# Patient Record
Sex: FEMALE | Race: WHITE | NOT HISPANIC OR LATINO
[De-identification: names, ages, dates, MRNs, and addresses within clinical notes are randomized per-mention and may not be internally consistent; named-entity substitution may affect disease eponyms.]

---

## 2017-10-19 ENCOUNTER — TRANSCRIPTION ENCOUNTER (OUTPATIENT)
Age: 57
End: 2017-10-19

## 2017-11-30 ENCOUNTER — TRANSCRIPTION ENCOUNTER (OUTPATIENT)
Age: 57
End: 2017-11-30

## 2017-12-03 ENCOUNTER — TRANSCRIPTION ENCOUNTER (OUTPATIENT)
Age: 57
End: 2017-12-03

## 2018-01-17 ENCOUNTER — TRANSCRIPTION ENCOUNTER (OUTPATIENT)
Age: 58
End: 2018-01-17

## 2018-01-18 ENCOUNTER — APPOINTMENT (OUTPATIENT)
Dept: INTERNAL MEDICINE | Facility: CLINIC | Age: 58
End: 2018-01-18
Payer: MEDICAID

## 2018-01-18 ENCOUNTER — APPOINTMENT (OUTPATIENT)
Dept: INTERNAL MEDICINE | Facility: CLINIC | Age: 58
End: 2018-01-18
Payer: SELF-PAY

## 2018-01-18 VITALS
WEIGHT: 152 LBS | HEIGHT: 63.5 IN | BODY MASS INDEX: 26.6 KG/M2 | DIASTOLIC BLOOD PRESSURE: 68 MMHG | TEMPERATURE: 98.9 F | HEART RATE: 81 BPM | SYSTOLIC BLOOD PRESSURE: 104 MMHG | OXYGEN SATURATION: 97 %

## 2018-01-18 DIAGNOSIS — R35.0 FREQUENCY OF MICTURITION: ICD-10-CM

## 2018-01-18 DIAGNOSIS — Z87.891 PERSONAL HISTORY OF NICOTINE DEPENDENCE: ICD-10-CM

## 2018-01-18 DIAGNOSIS — Z83.79 FAMILY HISTORY OF OTHER DISEASES OF THE DIGESTIVE SYSTEM: ICD-10-CM

## 2018-01-18 DIAGNOSIS — Z82.69 FAMILY HISTORY OF OTHER DISEASES OF THE MUSCULOSKELETAL SYSTEM AND CONNECTIVE TISSUE: ICD-10-CM

## 2018-01-18 DIAGNOSIS — Z82.49 FAMILY HISTORY OF ISCHEMIC HEART DISEASE AND OTHER DISEASES OF THE CIRCULATORY SYSTEM: ICD-10-CM

## 2018-01-18 DIAGNOSIS — E66.3 OVERWEIGHT: ICD-10-CM

## 2018-01-18 DIAGNOSIS — M17.10 UNILATERAL PRIMARY OSTEOARTHRITIS, UNSPECIFIED KNEE: ICD-10-CM

## 2018-01-18 DIAGNOSIS — Z80.8 FAMILY HISTORY OF MALIGNANT NEOPLASM OF OTHER ORGANS OR SYSTEMS: ICD-10-CM

## 2018-01-18 DIAGNOSIS — Z92.89 PERSONAL HISTORY OF OTHER MEDICAL TREATMENT: ICD-10-CM

## 2018-01-18 DIAGNOSIS — Z12.72 ENCOUNTER FOR SCREENING FOR MALIGNANT NEOPLASM OF VAGINA: ICD-10-CM

## 2018-01-18 DIAGNOSIS — R74.0 NONSPECIFIC ELEVATION OF LEVELS OF TRANSAMINASE AND LACTIC ACID DEHYDROGENASE [LDH]: ICD-10-CM

## 2018-01-18 DIAGNOSIS — Z83.511 FAMILY HISTORY OF GLAUCOMA: ICD-10-CM

## 2018-01-18 DIAGNOSIS — Z81.8 FAMILY HISTORY OF OTHER MENTAL AND BEHAVIORAL DISORDERS: ICD-10-CM

## 2018-01-18 DIAGNOSIS — Z01.00 ENCOUNTER FOR EXAMINATION OF EYES AND VISION W/OUT ABNORMAL FINDINGS: ICD-10-CM

## 2018-01-18 DIAGNOSIS — Z78.9 OTHER SPECIFIED HEALTH STATUS: ICD-10-CM

## 2018-01-18 DIAGNOSIS — Z23 ENCOUNTER FOR IMMUNIZATION: ICD-10-CM

## 2018-01-18 PROCEDURE — 81002 URINALYSIS NONAUTO W/O SCOPE: CPT

## 2018-01-18 PROCEDURE — 90717 YELLOW FEVER VACCINE SUBQ: CPT

## 2018-01-18 PROCEDURE — 99213 OFFICE O/P EST LOW 20 MIN: CPT | Mod: 25

## 2018-01-18 PROCEDURE — 93000 ELECTROCARDIOGRAM COMPLETE: CPT

## 2018-01-18 PROCEDURE — 36415 COLL VENOUS BLD VENIPUNCTURE: CPT

## 2018-01-18 PROCEDURE — 99386 PREV VISIT NEW AGE 40-64: CPT | Mod: 25

## 2018-01-18 PROCEDURE — 90471 IMMUNIZATION ADMIN: CPT

## 2018-01-18 RX ORDER — ALBUTEROL SULFATE 90 UG/1
108 (90 BASE) AEROSOL, METERED RESPIRATORY (INHALATION)
Qty: 18 | Refills: 0 | Status: COMPLETED | COMMUNITY
Start: 2017-09-15

## 2018-01-18 RX ORDER — AZITHROMYCIN 250 MG/1
250 TABLET, FILM COATED ORAL
Qty: 6 | Refills: 0 | Status: COMPLETED | COMMUNITY
Start: 2017-09-15

## 2018-01-18 RX ORDER — VITAMIN B COMPLEX
CAPSULE ORAL
Refills: 0 | Status: ACTIVE | COMMUNITY

## 2018-01-18 RX ORDER — TURM/GING/BOS/YUC/WIL/CHAM/HOR 100-100 MG
TABLET ORAL
Refills: 0 | Status: ACTIVE | COMMUNITY

## 2018-01-18 RX ORDER — POLYMYXIN B SULFATE AND TRIMETHOPRIM 10000; 1 [USP'U]/ML; MG/ML
10000-0.1 SOLUTION OPHTHALMIC
Qty: 10 | Refills: 0 | Status: COMPLETED | COMMUNITY
Start: 2017-11-30

## 2018-01-18 RX ORDER — SAW/PYGEUM/BETA/HERB/D3/B6/ZN 30 MG-25MG
CAPSULE ORAL
Refills: 0 | Status: ACTIVE | COMMUNITY

## 2018-01-18 RX ORDER — DICLOFENAC SODIUM 100 MG/1
100 TABLET, FILM COATED, EXTENDED RELEASE ORAL
Qty: 10 | Refills: 0 | Status: COMPLETED | COMMUNITY
Start: 2017-08-21

## 2018-01-18 RX ORDER — TOBRAMYCIN 3 MG/ML
0.3 SOLUTION/ DROPS OPHTHALMIC
Qty: 5 | Refills: 0 | Status: COMPLETED | COMMUNITY
Start: 2017-12-03

## 2018-01-18 RX ORDER — BENZONATATE 200 MG/1
200 CAPSULE ORAL
Qty: 20 | Refills: 0 | Status: COMPLETED | COMMUNITY
Start: 2017-09-15

## 2018-01-18 RX ORDER — FLUOCINONIDE 0.5 MG/G
0.05 CREAM TOPICAL
Qty: 30 | Refills: 0 | Status: COMPLETED | COMMUNITY
Start: 2017-11-20

## 2018-01-18 RX ORDER — FLUTICASONE PROPIONATE 50 UG/1
50 SPRAY, METERED NASAL DAILY
Qty: 3 | Refills: 3 | Status: ACTIVE | COMMUNITY
Start: 2018-01-18

## 2018-01-22 LAB
25(OH)D3 SERPL-MCNC: 32.1 NG/ML
ALBUMIN SERPL ELPH-MCNC: 3.8 G/DL
ALP BLD-CCNC: 62 U/L
ALT SERPL-CCNC: 21 U/L
ANION GAP SERPL CALC-SCNC: 17 MMOL/L
AST SERPL-CCNC: 29 U/L
BASOPHILS # BLD AUTO: 0.03 K/UL
BASOPHILS NFR BLD AUTO: 0.5 %
BILIRUB SERPL-MCNC: 0.4 MG/DL
BUN SERPL-MCNC: 18 MG/DL
CALCIUM SERPL-MCNC: 9.3 MG/DL
CHLORIDE SERPL-SCNC: 107 MMOL/L
CHOLEST SERPL-MCNC: 173 MG/DL
CHOLEST/HDLC SERPL: 3.5 RATIO
CO2 SERPL-SCNC: 23 MMOL/L
CREAT SERPL-MCNC: 0.78 MG/DL
EOSINOPHIL # BLD AUTO: 0.25 K/UL
EOSINOPHIL NFR BLD AUTO: 3.9 %
GLUCOSE SERPL-MCNC: 88 MG/DL
HBA1C MFR BLD HPLC: 5.3 %
HCT VFR BLD CALC: 41.4 %
HDLC SERPL-MCNC: 50 MG/DL
HGB BLD-MCNC: 13.7 G/DL
IMM GRANULOCYTES NFR BLD AUTO: 0.2 %
LDLC SERPL CALC-MCNC: 90 MG/DL
LYMPHOCYTES # BLD AUTO: 1.51 K/UL
LYMPHOCYTES NFR BLD AUTO: 23.5 %
MAN DIFF?: NORMAL
MCHC RBC-ENTMCNC: 29.7 PG
MCHC RBC-ENTMCNC: 33.1 GM/DL
MCV RBC AUTO: 89.8 FL
MONOCYTES # BLD AUTO: 0.58 K/UL
MONOCYTES NFR BLD AUTO: 9 %
NEUTROPHILS # BLD AUTO: 4.04 K/UL
NEUTROPHILS NFR BLD AUTO: 62.9 %
PLATELET # BLD AUTO: 188 K/UL
POTASSIUM SERPL-SCNC: 4.3 MMOL/L
PROT SERPL-MCNC: 7.6 G/DL
RBC # BLD: 4.61 M/UL
RBC # FLD: 13.9 %
SODIUM SERPL-SCNC: 147 MMOL/L
TRIGL SERPL-MCNC: 166 MG/DL
TSH SERPL-ACNC: 0.05 UIU/ML
WBC # FLD AUTO: 6.42 K/UL

## 2018-07-28 PROBLEM — Z80.8 FAMILY HISTORY OF SKIN CANCER: Status: ACTIVE | Noted: 2018-01-18

## 2020-07-16 ENCOUNTER — TRANSCRIPTION ENCOUNTER (OUTPATIENT)
Age: 60
End: 2020-07-16

## 2020-08-11 ENCOUNTER — APPOINTMENT (OUTPATIENT)
Dept: INTERNAL MEDICINE | Facility: CLINIC | Age: 60
End: 2020-08-11
Payer: MEDICAID

## 2020-08-11 VITALS
DIASTOLIC BLOOD PRESSURE: 70 MMHG | HEART RATE: 74 BPM | SYSTOLIC BLOOD PRESSURE: 106 MMHG | WEIGHT: 146 LBS | OXYGEN SATURATION: 97 % | TEMPERATURE: 96.8 F | HEIGHT: 63 IN | BODY MASS INDEX: 25.87 KG/M2

## 2020-08-11 PROCEDURE — 99386 PREV VISIT NEW AGE 40-64: CPT

## 2020-08-11 PROCEDURE — 36415 COLL VENOUS BLD VENIPUNCTURE: CPT

## 2020-08-11 NOTE — HISTORY OF PRESENT ILLNESS
[de-identified] : 59 yo f with hasimotos, Needs a new PMD\par on metformin for insulin resistance\par Trying to lose weight- fasting, bikes a lot and walks a lot.   - Has struggled with weight for years.\par Endo-0 sees Dr Alec Phoenix\par F- 89, M-  at 89- dementia\par Mammo- referral, just saw gyn,  needs derm\par Colonoscopy q 5 years- last 2 years ago, EGD 12 years ago. \par Dexa- has some bone loss\par tetanus in 2015 trip to [place]

## 2020-08-11 NOTE — PLAN
[FreeTextEntry1] : Welness complete\par labs today\par Check thyroid paneal\par  check a1c on Metformin\par uptodate with HCM\par f/up annually\par derm referral

## 2020-08-11 NOTE — PHYSICAL EXAM
[No Acute Distress] : no acute distress [Well Developed] : well developed [Well Nourished] : well nourished [Well-Appearing] : well-appearing [Normal Sclera/Conjunctiva] : normal sclera/conjunctiva [EOMI] : extraocular movements intact [Normal Outer Ear/Nose] : the outer ears and nose were normal in appearance [PERRL] : pupils equal round and reactive to light [No Lymphadenopathy] : no lymphadenopathy [No JVD] : no jugular venous distention [Normal Oropharynx] : the oropharynx was normal [Supple] : supple [Thyroid Normal, No Nodules] : the thyroid was normal and there were no nodules present [No Respiratory Distress] : no respiratory distress  [No Accessory Muscle Use] : no accessory muscle use [Clear to Auscultation] : lungs were clear to auscultation bilaterally [Regular Rhythm] : with a regular rhythm [Normal Rate] : normal rate  [Normal S1, S2] : normal S1 and S2 [No Murmur] : no murmur heard [Pedal Pulses Present] : the pedal pulses are present [No Edema] : there was no peripheral edema [Non Tender] : non-tender [Soft] : abdomen soft [No Masses] : no abdominal mass palpated [Non-distended] : non-distended [No HSM] : no HSM [Normal Posterior Cervical Nodes] : no posterior cervical lymphadenopathy [Normal Anterior Cervical Nodes] : no anterior cervical lymphadenopathy [Normal Bowel Sounds] : normal bowel sounds [No CVA Tenderness] : no CVA  tenderness [No Spinal Tenderness] : no spinal tenderness [Grossly Normal Strength/Tone] : grossly normal strength/tone [No Joint Swelling] : no joint swelling [Coordination Grossly Intact] : coordination grossly intact [No Rash] : no rash [No Focal Deficits] : no focal deficits [Normal Affect] : the affect was normal [Normal Gait] : normal gait [Normal Insight/Judgement] : insight and judgment were intact

## 2020-08-12 LAB
25(OH)D3 SERPL-MCNC: 47.6 NG/ML
ALBUMIN SERPL ELPH-MCNC: 4.6 G/DL
ALP BLD-CCNC: 53 U/L
ALT SERPL-CCNC: 19 U/L
ANION GAP SERPL CALC-SCNC: 14 MMOL/L
APPEARANCE: CLEAR
AST SERPL-CCNC: 22 U/L
BASOPHILS # BLD AUTO: 0.05 K/UL
BASOPHILS NFR BLD AUTO: 0.9 %
BILIRUB SERPL-MCNC: 0.5 MG/DL
BILIRUBIN URINE: NEGATIVE
BLOOD URINE: NEGATIVE
BUN SERPL-MCNC: 14 MG/DL
CALCIUM SERPL-MCNC: 9.3 MG/DL
CHLORIDE SERPL-SCNC: 104 MMOL/L
CHOLEST SERPL-MCNC: 176 MG/DL
CHOLEST/HDLC SERPL: 3.2 RATIO
CO2 SERPL-SCNC: 23 MMOL/L
COLOR: NORMAL
CREAT SERPL-MCNC: 0.6 MG/DL
EOSINOPHIL # BLD AUTO: 0.14 K/UL
EOSINOPHIL NFR BLD AUTO: 2.7 %
ESTIMATED AVERAGE GLUCOSE: 100 MG/DL
GLUCOSE QUALITATIVE U: NEGATIVE
GLUCOSE SERPL-MCNC: 98 MG/DL
HBA1C MFR BLD HPLC: 5.1 %
HCT VFR BLD CALC: 45.7 %
HDLC SERPL-MCNC: 55 MG/DL
HGB BLD-MCNC: 14.5 G/DL
IMM GRANULOCYTES NFR BLD AUTO: 0.2 %
KETONES URINE: NEGATIVE
LDLC SERPL CALC-MCNC: 105 MG/DL
LEUKOCYTE ESTERASE URINE: NEGATIVE
LYMPHOCYTES # BLD AUTO: 1.83 K/UL
LYMPHOCYTES NFR BLD AUTO: 34.7 %
MAN DIFF?: NORMAL
MCHC RBC-ENTMCNC: 30.6 PG
MCHC RBC-ENTMCNC: 31.7 GM/DL
MCV RBC AUTO: 96.4 FL
MONOCYTES # BLD AUTO: 0.55 K/UL
MONOCYTES NFR BLD AUTO: 10.4 %
NEUTROPHILS # BLD AUTO: 2.7 K/UL
NEUTROPHILS NFR BLD AUTO: 51.1 %
NITRITE URINE: NEGATIVE
PH URINE: 5.5
PLATELET # BLD AUTO: 178 K/UL
POTASSIUM SERPL-SCNC: 4.3 MMOL/L
PROT SERPL-MCNC: 7.3 G/DL
PROTEIN URINE: NEGATIVE
RBC # BLD: 4.74 M/UL
RBC # FLD: 13.3 %
SODIUM SERPL-SCNC: 141 MMOL/L
SPECIFIC GRAVITY URINE: 1.01
T3FREE SERPL-MCNC: 5.59 PG/ML
T4 FREE SERPL-MCNC: 1.2 NG/DL
TRIGL SERPL-MCNC: 83 MG/DL
TSH SERPL-ACNC: 0.06 UIU/ML
UROBILINOGEN URINE: NORMAL
WBC # FLD AUTO: 5.28 K/UL

## 2020-10-21 ENCOUNTER — TRANSCRIPTION ENCOUNTER (OUTPATIENT)
Age: 60
End: 2020-10-21

## 2020-10-28 ENCOUNTER — TRANSCRIPTION ENCOUNTER (OUTPATIENT)
Age: 60
End: 2020-10-28

## 2020-11-04 ENCOUNTER — APPOINTMENT (OUTPATIENT)
Dept: DERMATOLOGY | Facility: CLINIC | Age: 60
End: 2020-11-04
Payer: MEDICAID

## 2020-11-04 VITALS — SYSTOLIC BLOOD PRESSURE: 104 MMHG | DIASTOLIC BLOOD PRESSURE: 75 MMHG | TEMPERATURE: 97.7 F

## 2020-11-04 DIAGNOSIS — Z91.89 OTHER SPECIFIED PERSONAL RISK FACTORS, NOT ELSEWHERE CLASSIFIED: ICD-10-CM

## 2020-11-04 DIAGNOSIS — B07.8 OTHER VIRAL WARTS: ICD-10-CM

## 2020-11-04 DIAGNOSIS — Z80.9 FAMILY HISTORY OF MALIGNANT NEOPLASM, UNSPECIFIED: ICD-10-CM

## 2020-11-04 PROCEDURE — 17000 DESTRUCT PREMALG LESION: CPT | Mod: 59

## 2020-11-04 PROCEDURE — 99203 OFFICE O/P NEW LOW 30 MIN: CPT | Mod: 25

## 2020-11-04 PROCEDURE — 99072 ADDL SUPL MATRL&STAF TM PHE: CPT

## 2020-11-04 PROCEDURE — D0051: CPT

## 2020-11-04 PROCEDURE — 17110 DESTRUCTION B9 LES UP TO 14: CPT | Mod: 59

## 2020-11-04 PROCEDURE — 17003 DESTRUCT PREMALG LES 2-14: CPT | Mod: 59

## 2020-11-04 NOTE — HISTORY OF PRESENT ILLNESS
[FreeTextEntry1] : spots on scalp and back [de-identified] : 61 yo F here for above\par irritated spot on lower back, sticking out irritated by clothing\par also scabs in scalp - has had frozen in past, also used kerac cream in past but not recently, did not use on scalp because was afraid of hairloss\par spot on thumb that grows back after clipping it off x 1 yr

## 2020-11-04 NOTE — PHYSICAL EXAM
[Alert] : alert [Oriented x 3] : ~L oriented x 3 [Well Nourished] : well nourished [Conjunctiva Non-injected] : conjunctiva non-injected [No Visual Lymphadenopathy] : no visual  lymphadenopathy [No Clubbing] : no clubbing [No Edema] : no edema [No Bromhidrosis] : no bromhidrosis [No Chromhidrosis] : no chromhidrosis [FreeTextEntry3] : hyperkeratotic pink papules back x 1 and numerous with crust superior scalp\par firm white papules bilateral cheeks and L nose\par stuck on tan crusted papule on back\par L thumb hyperkeratotic 2 mm depressed papule - smooth when pared with retention of skin lines

## 2020-11-23 ENCOUNTER — TRANSCRIPTION ENCOUNTER (OUTPATIENT)
Age: 60
End: 2020-11-23

## 2020-11-24 ENCOUNTER — TRANSCRIPTION ENCOUNTER (OUTPATIENT)
Age: 60
End: 2020-11-24

## 2020-11-30 ENCOUNTER — TRANSCRIPTION ENCOUNTER (OUTPATIENT)
Age: 60
End: 2020-11-30

## 2020-12-02 ENCOUNTER — TRANSCRIPTION ENCOUNTER (OUTPATIENT)
Age: 60
End: 2020-12-02

## 2020-12-21 ENCOUNTER — TRANSCRIPTION ENCOUNTER (OUTPATIENT)
Age: 60
End: 2020-12-21

## 2020-12-23 ENCOUNTER — TRANSCRIPTION ENCOUNTER (OUTPATIENT)
Age: 60
End: 2020-12-23

## 2020-12-28 ENCOUNTER — NON-APPOINTMENT (OUTPATIENT)
Age: 60
End: 2020-12-28

## 2020-12-29 ENCOUNTER — TRANSCRIPTION ENCOUNTER (OUTPATIENT)
Age: 60
End: 2020-12-29

## 2020-12-31 ENCOUNTER — TRANSCRIPTION ENCOUNTER (OUTPATIENT)
Age: 60
End: 2020-12-31

## 2021-01-03 ENCOUNTER — NON-APPOINTMENT (OUTPATIENT)
Age: 61
End: 2021-01-03

## 2021-01-03 ENCOUNTER — TRANSCRIPTION ENCOUNTER (OUTPATIENT)
Age: 61
End: 2021-01-03

## 2021-01-04 ENCOUNTER — TRANSCRIPTION ENCOUNTER (OUTPATIENT)
Age: 61
End: 2021-01-04

## 2021-01-05 ENCOUNTER — TRANSCRIPTION ENCOUNTER (OUTPATIENT)
Age: 61
End: 2021-01-05

## 2021-01-07 ENCOUNTER — TRANSCRIPTION ENCOUNTER (OUTPATIENT)
Age: 61
End: 2021-01-07

## 2021-01-14 ENCOUNTER — LABORATORY RESULT (OUTPATIENT)
Age: 61
End: 2021-01-14

## 2021-01-14 ENCOUNTER — APPOINTMENT (OUTPATIENT)
Dept: DERMATOLOGY | Facility: CLINIC | Age: 61
End: 2021-01-14
Payer: MEDICAID

## 2021-01-14 VITALS — TEMPERATURE: 97.3 F

## 2021-01-14 DIAGNOSIS — D48.7 NEOPLASM OF UNCERTAIN BEHAVIOR OF OTHER SPECIFIED SITES: ICD-10-CM

## 2021-01-14 PROCEDURE — 17000 DESTRUCT PREMALG LESION: CPT | Mod: 59

## 2021-01-14 PROCEDURE — 99072 ADDL SUPL MATRL&STAF TM PHE: CPT

## 2021-01-14 PROCEDURE — 11103 TANGNTL BX SKIN EA SEP/ADDL: CPT | Mod: 59

## 2021-01-14 PROCEDURE — 99214 OFFICE O/P EST MOD 30 MIN: CPT | Mod: 25

## 2021-01-14 PROCEDURE — 11102 TANGNTL BX SKIN SINGLE LES: CPT

## 2021-01-14 NOTE — PHYSICAL EXAM
[Alert] : alert [Oriented x 3] : ~L oriented x 3 [Well Nourished] : well nourished [Conjunctiva Non-injected] : conjunctiva non-injected [No Visual Lymphadenopathy] : no visual  lymphadenopathy [No Clubbing] : no clubbing [No Edema] : no edema [No Bromhidrosis] : no bromhidrosis [No Chromhidrosis] : no chromhidrosis [Full Body Skin Exam Performed] : performed [FreeTextEntry3] : innumerable lentigines on trunk and face\par numerous erythematous scaly plaques hairline and scalp - no erosions\par L nasal bridge brown macule with darker brown irregular 1 mm papule\par medial mid spine 1-2 mm pink red papule with branching telangiectasia\par stuck on tan papule right inframammary\par possible very faint WHIs back - one mid spine and one superior and left of this

## 2021-01-14 NOTE — HISTORY OF PRESENT ILLNESS
[FreeTextEntry1] : fu scc, aks [de-identified] : using efudex scalp 2 weeks bid solution\par forehead 1 week bid cream\par \par hx remote scc x 2 back excised

## 2021-01-19 ENCOUNTER — NON-APPOINTMENT (OUTPATIENT)
Age: 61
End: 2021-01-19

## 2021-01-19 ENCOUNTER — APPOINTMENT (OUTPATIENT)
Dept: OPHTHALMOLOGY | Facility: CLINIC | Age: 61
End: 2021-01-19
Payer: MEDICAID

## 2021-01-19 PROCEDURE — 92250 FUNDUS PHOTOGRAPHY W/I&R: CPT

## 2021-01-19 PROCEDURE — 99072 ADDL SUPL MATRL&STAF TM PHE: CPT

## 2021-01-19 PROCEDURE — 68761 CLOSE TEAR DUCT OPENING: CPT | Mod: E4,RT

## 2021-01-19 PROCEDURE — 92002 INTRM OPH EXAM NEW PATIENT: CPT | Mod: E4,25

## 2021-01-21 ENCOUNTER — TRANSCRIPTION ENCOUNTER (OUTPATIENT)
Age: 61
End: 2021-01-21

## 2021-01-25 ENCOUNTER — NON-APPOINTMENT (OUTPATIENT)
Age: 61
End: 2021-01-25

## 2021-01-25 RX ORDER — FLUOROURACIL 50 MG/ML
5 SOLUTION TOPICAL
Qty: 1 | Refills: 0 | Status: ACTIVE | COMMUNITY
Start: 2020-11-04 | End: 1900-01-01

## 2021-01-26 ENCOUNTER — NON-APPOINTMENT (OUTPATIENT)
Age: 61
End: 2021-01-26

## 2021-01-27 ENCOUNTER — TRANSCRIPTION ENCOUNTER (OUTPATIENT)
Age: 61
End: 2021-01-27

## 2021-02-08 ENCOUNTER — APPOINTMENT (OUTPATIENT)
Dept: INTERNAL MEDICINE | Facility: CLINIC | Age: 61
End: 2021-02-08
Payer: MEDICAID

## 2021-02-08 VITALS
BODY MASS INDEX: 25.87 KG/M2 | DIASTOLIC BLOOD PRESSURE: 78 MMHG | SYSTOLIC BLOOD PRESSURE: 118 MMHG | TEMPERATURE: 97.4 F | WEIGHT: 146 LBS | HEART RATE: 68 BPM | OXYGEN SATURATION: 98 % | HEIGHT: 63 IN

## 2021-02-08 DIAGNOSIS — M25.50 PAIN IN UNSPECIFIED JOINT: ICD-10-CM

## 2021-02-08 DIAGNOSIS — H04.123 DRY EYE SYNDROME OF BILATERAL LACRIMAL GLANDS: ICD-10-CM

## 2021-02-08 PROCEDURE — 99072 ADDL SUPL MATRL&STAF TM PHE: CPT

## 2021-02-08 PROCEDURE — 36415 COLL VENOUS BLD VENIPUNCTURE: CPT

## 2021-02-08 PROCEDURE — 99213 OFFICE O/P EST LOW 20 MIN: CPT

## 2021-02-08 NOTE — PHYSICAL EXAM
[Normal] : affect was normal and insight and judgment were intact [de-identified] : malia and elíasards nodes on both 5th digits  dip and pip.  [de-identified] : ertythematous macules spread over forehear b/l

## 2021-02-08 NOTE — PLAN
[FreeTextEntry1] : labs today for sjogrens\par  further workup based on results.\par \par f/up dermatology regularly. \par \par f/up ophtho.
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
## 2021-02-09 ENCOUNTER — TRANSCRIPTION ENCOUNTER (OUTPATIENT)
Age: 61
End: 2021-02-09

## 2021-02-09 ENCOUNTER — NON-APPOINTMENT (OUTPATIENT)
Age: 61
End: 2021-02-09

## 2021-02-10 ENCOUNTER — TRANSCRIPTION ENCOUNTER (OUTPATIENT)
Age: 61
End: 2021-02-10

## 2021-02-10 DIAGNOSIS — L24.9 IRRITANT CONTACT DERMATITIS, UNSPECIFIED CAUSE: ICD-10-CM

## 2021-02-10 LAB
ANA SER IF-ACNC: NEGATIVE
ENA SS-A AB SER IA-ACNC: <0.2 AL
ENA SS-B AB SER IA-ACNC: <0.2 AL
ERYTHROCYTE [SEDIMENTATION RATE] IN BLOOD BY WESTERGREN METHOD: 24 MM/HR

## 2021-02-10 RX ORDER — MUPIROCIN 20 MG/G
2 OINTMENT TOPICAL
Qty: 1 | Refills: 1 | Status: ACTIVE | COMMUNITY
Start: 2021-02-10 | End: 1900-01-01

## 2021-02-11 ENCOUNTER — TRANSCRIPTION ENCOUNTER (OUTPATIENT)
Age: 61
End: 2021-02-11

## 2021-02-13 ENCOUNTER — TRANSCRIPTION ENCOUNTER (OUTPATIENT)
Age: 61
End: 2021-02-13

## 2021-02-13 RX ORDER — HYDROCORTISONE 25 MG/G
2.5 OINTMENT TOPICAL
Qty: 1 | Refills: 0 | Status: ACTIVE | COMMUNITY
Start: 2021-02-13 | End: 1900-01-01

## 2021-02-16 ENCOUNTER — TRANSCRIPTION ENCOUNTER (OUTPATIENT)
Age: 61
End: 2021-02-16

## 2021-02-16 ENCOUNTER — APPOINTMENT (OUTPATIENT)
Dept: OPHTHALMOLOGY | Facility: CLINIC | Age: 61
End: 2021-02-16

## 2021-02-17 ENCOUNTER — TRANSCRIPTION ENCOUNTER (OUTPATIENT)
Age: 61
End: 2021-02-17

## 2021-02-17 ENCOUNTER — NON-APPOINTMENT (OUTPATIENT)
Age: 61
End: 2021-02-17

## 2021-02-22 ENCOUNTER — TRANSCRIPTION ENCOUNTER (OUTPATIENT)
Age: 61
End: 2021-02-22

## 2021-02-24 ENCOUNTER — TRANSCRIPTION ENCOUNTER (OUTPATIENT)
Age: 61
End: 2021-02-24

## 2021-04-05 ENCOUNTER — APPOINTMENT (OUTPATIENT)
Dept: DERMATOLOGY | Facility: CLINIC | Age: 61
End: 2021-04-05
Payer: MEDICAID

## 2021-04-05 PROCEDURE — 99213 OFFICE O/P EST LOW 20 MIN: CPT | Mod: 25

## 2021-04-05 PROCEDURE — 99072 ADDL SUPL MATRL&STAF TM PHE: CPT

## 2021-04-05 PROCEDURE — 17110 DESTRUCTION B9 LES UP TO 14: CPT

## 2021-04-05 NOTE — HISTORY OF PRESENT ILLNESS
[FreeTextEntry1] : fu aks [de-identified] : recently tx scalp and face with 5FU, robust reaction\par not much reaction on hands\par has hair transplant scheduled for May, seeing surgeon this Friday\par \par irritated growth on forehead with brushing of hair, 2 under bra strap\par \par hx remote scc x 2 back excised

## 2021-04-05 NOTE — PHYSICAL EXAM
[Alert] : alert [Oriented x 3] : ~L oriented x 3 [Well Nourished] : well nourished [Conjunctiva Non-injected] : conjunctiva non-injected [No Visual Lymphadenopathy] : no visual  lymphadenopathy [No Clubbing] : no clubbing [No Edema] : no edema [No Bromhidrosis] : no bromhidrosis [No Chromhidrosis] : no chromhidrosis [FreeTextEntry3] : stuck on tan papules L frontal scalp, L inframammary and right lateral trunk\par thinning alopecia across scalp with some miniaturization and gritty pink papules

## 2021-04-12 ENCOUNTER — TRANSCRIPTION ENCOUNTER (OUTPATIENT)
Age: 61
End: 2021-04-12

## 2021-04-13 ENCOUNTER — TRANSCRIPTION ENCOUNTER (OUTPATIENT)
Age: 61
End: 2021-04-13

## 2021-04-14 ENCOUNTER — TRANSCRIPTION ENCOUNTER (OUTPATIENT)
Age: 61
End: 2021-04-14

## 2021-04-17 ENCOUNTER — NON-APPOINTMENT (OUTPATIENT)
Age: 61
End: 2021-04-17

## 2021-04-19 ENCOUNTER — TRANSCRIPTION ENCOUNTER (OUTPATIENT)
Age: 61
End: 2021-04-19

## 2021-04-19 ENCOUNTER — RX RENEWAL (OUTPATIENT)
Age: 61
End: 2021-04-19

## 2021-04-19 RX ORDER — FLUOROURACIL 50 MG/G
5 CREAM TOPICAL
Qty: 40 | Refills: 0 | Status: ACTIVE | COMMUNITY
Start: 2020-12-23 | End: 1900-01-01

## 2021-04-24 ENCOUNTER — TRANSCRIPTION ENCOUNTER (OUTPATIENT)
Age: 61
End: 2021-04-24

## 2021-04-25 ENCOUNTER — TRANSCRIPTION ENCOUNTER (OUTPATIENT)
Age: 61
End: 2021-04-25

## 2021-05-12 ENCOUNTER — TRANSCRIPTION ENCOUNTER (OUTPATIENT)
Age: 61
End: 2021-05-12

## 2021-05-12 ENCOUNTER — APPOINTMENT (OUTPATIENT)
Dept: DERMATOLOGY | Facility: CLINIC | Age: 61
End: 2021-05-12
Payer: MEDICAID

## 2021-05-12 DIAGNOSIS — R23.8 OTHER SKIN CHANGES: ICD-10-CM

## 2021-05-12 PROCEDURE — 11900 INJECT SKIN LESIONS </W 7: CPT

## 2021-05-12 PROCEDURE — 99214 OFFICE O/P EST MOD 30 MIN: CPT | Mod: 25

## 2021-05-12 PROCEDURE — 99072 ADDL SUPL MATRL&STAF TM PHE: CPT

## 2021-05-12 NOTE — HISTORY OF PRESENT ILLNESS
[FreeTextEntry1] : hairloss [de-identified] : hx aks face and scalp with robust 5FU reaction\par recently saw plastic surgeon for hair transplant and did biopsy showing scarring hairloss and androgenetic alopecia\par here to discuss\par \par always been thin but last 2 years, especially 1 year accelerated \par never saw shedding\par used to have widows peak, now gone\par loss on lateral eyebrows earlier\par \par a little itchy sometimes  \par \par spot on thumb painful, no response to cryo

## 2021-05-12 NOTE — PHYSICAL EXAM
[Alert] : alert [Oriented x 3] : ~L oriented x 3 [Well Nourished] : well nourished [Conjunctiva Non-injected] : conjunctiva non-injected [No Visual Lymphadenopathy] : no visual  lymphadenopathy [No Clubbing] : no clubbing [No Edema] : no edema [No Bromhidrosis] : no bromhidrosis [No Chromhidrosis] : no chromhidrosis [FreeTextEntry3] : scalp with thinning alopecia superior scalp with miniaturized hairs\par erythema diffuse across superior scalp only, some follicular fine scale on dermoscopy\par difficult to visualize ostia due to lightness of hairs and scalp erythema\par also thin gritty pink papules with crust superimposed over diffuse erythema\par L parietal scalp with punch biopsy scar and patch of new short regrowth (denies steroid treatment)\par hair is dyed light red (done with bleach and red color)\par lateral thinning alopecia of bilateral eyebrows\par L thumb smooth 1 mm skin colored papule with interruption of skin lines

## 2021-05-17 ENCOUNTER — TRANSCRIPTION ENCOUNTER (OUTPATIENT)
Age: 61
End: 2021-05-17

## 2021-06-09 ENCOUNTER — APPOINTMENT (OUTPATIENT)
Dept: DERMATOLOGY | Facility: CLINIC | Age: 61
End: 2021-06-09

## 2021-06-09 ENCOUNTER — APPOINTMENT (OUTPATIENT)
Dept: DERMATOLOGY | Facility: CLINIC | Age: 61
End: 2021-06-09
Payer: MEDICAID

## 2021-06-09 DIAGNOSIS — L64.9 ANDROGENIC ALOPECIA, UNSPECIFIED: ICD-10-CM

## 2021-06-09 PROCEDURE — 36415 COLL VENOUS BLD VENIPUNCTURE: CPT

## 2021-06-09 PROCEDURE — 11900 INJECT SKIN LESIONS </W 7: CPT

## 2021-06-09 PROCEDURE — 99214 OFFICE O/P EST MOD 30 MIN: CPT | Mod: 25

## 2021-06-09 NOTE — PHYSICAL EXAM
[Alert] : alert [Oriented x 3] : ~L oriented x 3 [Well Nourished] : well nourished [Conjunctiva Non-injected] : conjunctiva non-injected [No Visual Lymphadenopathy] : no visual  lymphadenopathy [No Clubbing] : no clubbing [No Edema] : no edema [No Bromhidrosis] : no bromhidrosis [No Chromhidrosis] : no chromhidrosis [FreeTextEntry3] : scalp with thinning alopecia superior scalp with miniaturized hairs\par erythema diffuse across superior scalp only, some follicular fine scale on dermoscopy only (decreased from prior to fluocinonide use)\par some areas of follicular dropout\par

## 2021-06-09 NOTE — HISTORY OF PRESENT ILLNESS
[FreeTextEntry1] : FU LPP [de-identified] : biopsy - FPHL and scarring alopecia\par had ILK May 12, also using steroid solution and minoxidil daily\par no itch, but still red\par \par hx SCC

## 2021-06-18 ENCOUNTER — TRANSCRIPTION ENCOUNTER (OUTPATIENT)
Age: 61
End: 2021-06-18

## 2021-06-21 ENCOUNTER — TRANSCRIPTION ENCOUNTER (OUTPATIENT)
Age: 61
End: 2021-06-21

## 2021-06-21 LAB
ALBUMIN SERPL ELPH-MCNC: 4.3 G/DL
ALP BLD-CCNC: 59 U/L
ALT SERPL-CCNC: 19 U/L
ANION GAP SERPL CALC-SCNC: 10 MMOL/L
AST SERPL-CCNC: 20 U/L
BASOPHILS # BLD AUTO: 0.05 K/UL
BASOPHILS NFR BLD AUTO: 0.8 %
BILIRUB SERPL-MCNC: 0.4 MG/DL
BUN SERPL-MCNC: 15 MG/DL
CALCIUM SERPL-MCNC: 9.3 MG/DL
CHLORIDE SERPL-SCNC: 104 MMOL/L
CO2 SERPL-SCNC: 24 MMOL/L
CREAT SERPL-MCNC: 0.68 MG/DL
EOSINOPHIL # BLD AUTO: 0.12 K/UL
EOSINOPHIL NFR BLD AUTO: 1.9 %
GLUCOSE SERPL-MCNC: 84 MG/DL
HBV CORE IGG+IGM SER QL: NONREACTIVE
HBV SURFACE AB SER QL: NONREACTIVE
HBV SURFACE AG SER QL: NONREACTIVE
HCT VFR BLD CALC: 45.3 %
HCV AB SER QL: NONREACTIVE
HCV S/CO RATIO: 0.09 S/CO
HGB BLD-MCNC: 15.3 G/DL
IMM GRANULOCYTES NFR BLD AUTO: 0.3 %
LYMPHOCYTES # BLD AUTO: 2.13 K/UL
LYMPHOCYTES NFR BLD AUTO: 33.5 %
MAN DIFF?: NORMAL
MCHC RBC-ENTMCNC: 31.1 PG
MCHC RBC-ENTMCNC: 33.8 GM/DL
MCV RBC AUTO: 92.1 FL
MONOCYTES # BLD AUTO: 0.58 K/UL
MONOCYTES NFR BLD AUTO: 9.1 %
NEUTROPHILS # BLD AUTO: 3.46 K/UL
NEUTROPHILS NFR BLD AUTO: 54.4 %
PLATELET # BLD AUTO: 214 K/UL
POTASSIUM SERPL-SCNC: 4.5 MMOL/L
PROT SERPL-MCNC: 7 G/DL
RBC # BLD: 4.92 M/UL
RBC # FLD: 13 %
SODIUM SERPL-SCNC: 138 MMOL/L
WBC # FLD AUTO: 6.36 K/UL

## 2021-06-23 LAB
M TB IFN-G BLD-IMP: NEGATIVE
QUANTIFERON TB PLUS MITOGEN MINUS NIL: 9.96 IU/ML
QUANTIFERON TB PLUS NIL: 0.02 IU/ML
QUANTIFERON TB PLUS TB1 MINUS NIL: 0 IU/ML
QUANTIFERON TB PLUS TB2 MINUS NIL: 0 IU/ML

## 2021-06-24 ENCOUNTER — TRANSCRIPTION ENCOUNTER (OUTPATIENT)
Age: 61
End: 2021-06-24

## 2021-06-25 ENCOUNTER — TRANSCRIPTION ENCOUNTER (OUTPATIENT)
Age: 61
End: 2021-06-25

## 2021-06-30 ENCOUNTER — TRANSCRIPTION ENCOUNTER (OUTPATIENT)
Age: 61
End: 2021-06-30

## 2021-07-01 ENCOUNTER — TRANSCRIPTION ENCOUNTER (OUTPATIENT)
Age: 61
End: 2021-07-01

## 2021-07-06 ENCOUNTER — TRANSCRIPTION ENCOUNTER (OUTPATIENT)
Age: 61
End: 2021-07-06

## 2021-07-06 RX ORDER — ZOSTER VACCINE RECOMBINANT, ADJUVANTED 50 MCG/0.5
50 KIT INTRAMUSCULAR
Qty: 1 | Refills: 1 | Status: ACTIVE | COMMUNITY
Start: 2021-07-06 | End: 1900-01-01

## 2021-07-07 ENCOUNTER — TRANSCRIPTION ENCOUNTER (OUTPATIENT)
Age: 61
End: 2021-07-07

## 2021-07-08 ENCOUNTER — TRANSCRIPTION ENCOUNTER (OUTPATIENT)
Age: 61
End: 2021-07-08

## 2021-07-08 ENCOUNTER — NON-APPOINTMENT (OUTPATIENT)
Age: 61
End: 2021-07-08

## 2021-07-08 LAB
BASOPHILS # BLD AUTO: 0.06 K/UL
BASOPHILS NFR BLD AUTO: 1.1 %
EOSINOPHIL # BLD AUTO: 0.19 K/UL
EOSINOPHIL NFR BLD AUTO: 3.6 %
HCT VFR BLD CALC: 43.4 %
HGB BLD-MCNC: 14.1 G/DL
IMM GRANULOCYTES NFR BLD AUTO: 0.2 %
LYMPHOCYTES # BLD AUTO: 2.43 K/UL
LYMPHOCYTES NFR BLD AUTO: 45.4 %
MAN DIFF?: NORMAL
MCHC RBC-ENTMCNC: 30.5 PG
MCHC RBC-ENTMCNC: 32.5 GM/DL
MCV RBC AUTO: 93.9 FL
MONOCYTES # BLD AUTO: 0.52 K/UL
MONOCYTES NFR BLD AUTO: 9.7 %
NEUTROPHILS # BLD AUTO: 2.14 K/UL
NEUTROPHILS NFR BLD AUTO: 40 %
PLATELET # BLD AUTO: 196 K/UL
RBC # BLD: 4.62 M/UL
RBC # FLD: 12.9 %
WBC # FLD AUTO: 5.35 K/UL

## 2021-07-09 LAB
ALBUMIN SERPL ELPH-MCNC: 4.3 G/DL
ALP BLD-CCNC: 50 U/L
ALT SERPL-CCNC: 16 U/L
ANION GAP SERPL CALC-SCNC: 14 MMOL/L
AST SERPL-CCNC: 20 U/L
BILIRUB SERPL-MCNC: 0.4 MG/DL
BUN SERPL-MCNC: 12 MG/DL
CALCIUM SERPL-MCNC: 9.1 MG/DL
CHLORIDE SERPL-SCNC: 105 MMOL/L
CO2 SERPL-SCNC: 19 MMOL/L
CREAT SERPL-MCNC: 0.62 MG/DL
GLUCOSE SERPL-MCNC: 92 MG/DL
POTASSIUM SERPL-SCNC: 4.7 MMOL/L
PROT SERPL-MCNC: 6.7 G/DL
SODIUM SERPL-SCNC: 138 MMOL/L

## 2021-07-10 ENCOUNTER — TRANSCRIPTION ENCOUNTER (OUTPATIENT)
Age: 61
End: 2021-07-10

## 2021-07-12 ENCOUNTER — TRANSCRIPTION ENCOUNTER (OUTPATIENT)
Age: 61
End: 2021-07-12

## 2021-07-12 ENCOUNTER — APPOINTMENT (OUTPATIENT)
Dept: DERMATOLOGY | Facility: CLINIC | Age: 61
End: 2021-07-12
Payer: MEDICAID

## 2021-07-12 PROCEDURE — 11900 INJECT SKIN LESIONS </W 7: CPT

## 2021-07-12 PROCEDURE — 99214 OFFICE O/P EST MOD 30 MIN: CPT | Mod: 25

## 2021-07-13 ENCOUNTER — TRANSCRIPTION ENCOUNTER (OUTPATIENT)
Age: 61
End: 2021-07-13

## 2021-07-13 NOTE — PHYSICAL EXAM
[Alert] : alert [Oriented x 3] : ~L oriented x 3 [Well Nourished] : well nourished [Conjunctiva Non-injected] : conjunctiva non-injected [No Visual Lymphadenopathy] : no visual  lymphadenopathy [No Clubbing] : no clubbing [No Edema] : no edema [No Bromhidrosis] : no bromhidrosis [No Chromhidrosis] : no chromhidrosis [Full Body Skin Exam Performed] : performed [FreeTextEntry3] : scalp with thinning alopecia superior scalp with miniaturized hairs\par erythema diffuse across superior scalp\par short regrowth along hairline and L frontal scalp\par gritty pink papules x 3 chest

## 2021-07-13 NOTE — HISTORY OF PRESENT ILLNESS
[FreeTextEntry1] : FU LPP, SCC [de-identified] : biopsy - FPHL and scarring alopecia\par feels like regrowing a lot of hair at hairline since started ILK in May\par also taking cellcept - total of 3 pills daily as of yesterday, no GI upset other than baseline heart burn \par using minoxidil in pm, fluocinonide in am\par eyebrows regrew with latisse\par \par hx SCC x 2 back >10 years ago, due for cbe

## 2021-07-19 ENCOUNTER — TRANSCRIPTION ENCOUNTER (OUTPATIENT)
Age: 61
End: 2021-07-19

## 2021-07-23 LAB
ALBUMIN SERPL ELPH-MCNC: 4.3 G/DL
ALP BLD-CCNC: 67 U/L
ALT SERPL-CCNC: 23 U/L
AST SERPL-CCNC: 24 U/L
BASOPHILS # BLD AUTO: 0.07 K/UL
BASOPHILS NFR BLD AUTO: 0.8 %
BILIRUB DIRECT SERPL-MCNC: 0.1 MG/DL
BILIRUB INDIRECT SERPL-MCNC: 0.2 MG/DL
BILIRUB SERPL-MCNC: 0.2 MG/DL
EOSINOPHIL # BLD AUTO: 0.2 K/UL
EOSINOPHIL NFR BLD AUTO: 2.3 %
HCT VFR BLD CALC: 43.9 %
HGB BLD-MCNC: 14.7 G/DL
IMM GRANULOCYTES NFR BLD AUTO: 0.5 %
LYMPHOCYTES # BLD AUTO: 2.74 K/UL
LYMPHOCYTES NFR BLD AUTO: 31.1 %
MAN DIFF?: NORMAL
MCHC RBC-ENTMCNC: 31.2 PG
MCHC RBC-ENTMCNC: 33.5 GM/DL
MCV RBC AUTO: 93.2 FL
MONOCYTES # BLD AUTO: 0.83 K/UL
MONOCYTES NFR BLD AUTO: 9.4 %
NEUTROPHILS # BLD AUTO: 4.94 K/UL
NEUTROPHILS NFR BLD AUTO: 55.9 %
PLATELET # BLD AUTO: 212 K/UL
PROT SERPL-MCNC: 7.3 G/DL
RBC # BLD: 4.71 M/UL
RBC # FLD: 13.1 %
WBC # FLD AUTO: 8.82 K/UL

## 2021-07-28 ENCOUNTER — APPOINTMENT (OUTPATIENT)
Dept: INTERNAL MEDICINE | Facility: CLINIC | Age: 61
End: 2021-07-28
Payer: MEDICAID

## 2021-07-28 ENCOUNTER — TRANSCRIPTION ENCOUNTER (OUTPATIENT)
Age: 61
End: 2021-07-28

## 2021-07-28 VITALS
HEIGHT: 63 IN | WEIGHT: 146 LBS | DIASTOLIC BLOOD PRESSURE: 74 MMHG | TEMPERATURE: 97.2 F | HEART RATE: 74 BPM | SYSTOLIC BLOOD PRESSURE: 112 MMHG | OXYGEN SATURATION: 97 % | BODY MASS INDEX: 25.87 KG/M2

## 2021-07-28 DIAGNOSIS — S41.119A LACERATION W/OUT FOREIGN BODY OF UNSPECIFIED UPPER ARM, INITIAL ENCOUNTER: ICD-10-CM

## 2021-07-28 PROCEDURE — 90746 HEPB VACCINE 3 DOSE ADULT IM: CPT

## 2021-07-28 PROCEDURE — 90471 IMMUNIZATION ADMIN: CPT

## 2021-07-28 PROCEDURE — 99213 OFFICE O/P EST LOW 20 MIN: CPT | Mod: 25

## 2021-07-28 NOTE — PHYSICAL EXAM
[de-identified] : bruising left ant shoulder   [de-identified] : left forearm - large laceration,
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
## 2021-07-28 NOTE — PLAN
[FreeTextEntry1] : left arm lac - cont neosporin daily  - keep is clean and covered   \par \par Hep B admin today

## 2021-08-02 ENCOUNTER — NON-APPOINTMENT (OUTPATIENT)
Age: 61
End: 2021-08-02

## 2021-08-03 ENCOUNTER — TRANSCRIPTION ENCOUNTER (OUTPATIENT)
Age: 61
End: 2021-08-03

## 2021-08-04 ENCOUNTER — TRANSCRIPTION ENCOUNTER (OUTPATIENT)
Age: 61
End: 2021-08-04

## 2021-08-05 ENCOUNTER — TRANSCRIPTION ENCOUNTER (OUTPATIENT)
Age: 61
End: 2021-08-05

## 2021-08-13 ENCOUNTER — TRANSCRIPTION ENCOUNTER (OUTPATIENT)
Age: 61
End: 2021-08-13

## 2021-08-18 ENCOUNTER — TRANSCRIPTION ENCOUNTER (OUTPATIENT)
Age: 61
End: 2021-08-18

## 2021-08-19 ENCOUNTER — APPOINTMENT (OUTPATIENT)
Dept: DERMATOLOGY | Facility: CLINIC | Age: 61
End: 2021-08-19
Payer: MEDICAID

## 2021-08-19 DIAGNOSIS — L90.5 SCAR CONDITIONS AND FIBROSIS OF SKIN: ICD-10-CM

## 2021-08-19 PROCEDURE — 17000 DESTRUCT PREMALG LESION: CPT

## 2021-08-19 PROCEDURE — 11900 INJECT SKIN LESIONS </W 7: CPT | Mod: 59

## 2021-08-19 PROCEDURE — 99214 OFFICE O/P EST MOD 30 MIN: CPT | Mod: 25

## 2021-08-19 NOTE — PHYSICAL EXAM
[Alert] : alert [Oriented x 3] : ~L oriented x 3 [Well Nourished] : well nourished [Conjunctiva Non-injected] : conjunctiva non-injected [No Visual Lymphadenopathy] : no visual  lymphadenopathy [No Edema] : no edema [No Clubbing] : no clubbing [No Bromhidrosis] : no bromhidrosis [No Chromhidrosis] : no chromhidrosis [Full Body Skin Exam Performed] : performed [FreeTextEntry3] : scalp with thinning alopecia superior scalp with miniaturized hairs\par erythema diffuse across superior scalp\par short significant regrowth along frontal hairline and L>R parietal scalp\par mild atrophy in 2 places\par scar with crust L forearm \par gritty pink papule L vertex\par
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
## 2021-08-22 LAB
ALBUMIN SERPL ELPH-MCNC: 4.4 G/DL
ALP BLD-CCNC: 57 U/L
ALT SERPL-CCNC: 20 U/L
ANION GAP SERPL CALC-SCNC: 11 MMOL/L
AST SERPL-CCNC: 19 U/L
BASOPHILS # BLD AUTO: 0.05 K/UL
BASOPHILS NFR BLD AUTO: 0.9 %
BILIRUB SERPL-MCNC: 0.3 MG/DL
BUN SERPL-MCNC: 11 MG/DL
CALCIUM SERPL-MCNC: 9.1 MG/DL
CHLORIDE SERPL-SCNC: 104 MMOL/L
CO2 SERPL-SCNC: 25 MMOL/L
COVID-19 SPIKE DOMAIN ANTIBODY INTERPRETATION: POSITIVE
CREAT SERPL-MCNC: 0.62 MG/DL
EOSINOPHIL # BLD AUTO: 0.17 K/UL
EOSINOPHIL NFR BLD AUTO: 3.1 %
GLUCOSE SERPL-MCNC: 104 MG/DL
HCT VFR BLD CALC: 42 %
HGB BLD-MCNC: 14.1 G/DL
IMM GRANULOCYTES NFR BLD AUTO: 0.2 %
LYMPHOCYTES # BLD AUTO: 2.16 K/UL
LYMPHOCYTES NFR BLD AUTO: 39.8 %
MAN DIFF?: NORMAL
MCHC RBC-ENTMCNC: 31.3 PG
MCHC RBC-ENTMCNC: 33.6 GM/DL
MCV RBC AUTO: 93.3 FL
MONOCYTES # BLD AUTO: 0.58 K/UL
MONOCYTES NFR BLD AUTO: 10.7 %
NEUTROPHILS # BLD AUTO: 2.46 K/UL
NEUTROPHILS NFR BLD AUTO: 45.3 %
PLATELET # BLD AUTO: 183 K/UL
POTASSIUM SERPL-SCNC: 4.2 MMOL/L
PROT SERPL-MCNC: 7 G/DL
RBC # BLD: 4.5 M/UL
RBC # FLD: 12.7 %
SARS-COV-2 AB SERPL IA-ACNC: >250 U/ML
SODIUM SERPL-SCNC: 140 MMOL/L
WBC # FLD AUTO: 5.43 K/UL

## 2021-08-26 ENCOUNTER — NON-APPOINTMENT (OUTPATIENT)
Age: 61
End: 2021-08-26

## 2021-09-17 ENCOUNTER — APPOINTMENT (OUTPATIENT)
Dept: INTERNAL MEDICINE | Facility: CLINIC | Age: 61
End: 2021-09-17
Payer: MEDICAID

## 2021-09-17 VITALS
BODY MASS INDEX: 25.69 KG/M2 | OXYGEN SATURATION: 97 % | HEART RATE: 76 BPM | DIASTOLIC BLOOD PRESSURE: 74 MMHG | WEIGHT: 145 LBS | HEIGHT: 63 IN | SYSTOLIC BLOOD PRESSURE: 114 MMHG | TEMPERATURE: 96.6 F

## 2021-09-17 DIAGNOSIS — Z23 ENCOUNTER FOR IMMUNIZATION: ICD-10-CM

## 2021-09-17 PROCEDURE — 90471 IMMUNIZATION ADMIN: CPT

## 2021-09-17 PROCEDURE — 90746 HEPB VACCINE 3 DOSE ADULT IM: CPT

## 2021-09-17 PROCEDURE — 99212 OFFICE O/P EST SF 10 MIN: CPT | Mod: 25

## 2021-09-17 NOTE — HISTORY OF PRESENT ILLNESS
[de-identified] : 60 yo f with Hashimotos\par has gained some weight\par  here for hep b #2 today\par \par already had covid vaccine booster.\par \par

## 2021-09-17 NOTE — PHYSICAL EXAM
[Normal] : no acute distress, well nourished, well developed and well-appearing [de-identified] : left forearm scarring

## 2021-09-17 NOTE — PLAN
[FreeTextEntry1] : hep B #2 today\par f/up Dr Coronel for hair thinning treatment\par encourage more exercise and to be mindful of her diet.\par \par hep B #3 in 4 months

## 2021-09-27 ENCOUNTER — APPOINTMENT (OUTPATIENT)
Dept: DERMATOLOGY | Facility: CLINIC | Age: 61
End: 2021-09-27
Payer: MEDICAID

## 2021-09-27 PROCEDURE — 17000 DESTRUCT PREMALG LESION: CPT

## 2021-09-27 PROCEDURE — 11900 INJECT SKIN LESIONS </W 7: CPT | Mod: 59

## 2021-09-28 ENCOUNTER — TRANSCRIPTION ENCOUNTER (OUTPATIENT)
Age: 61
End: 2021-09-28

## 2021-10-24 ENCOUNTER — RX RENEWAL (OUTPATIENT)
Age: 61
End: 2021-10-24

## 2021-11-29 ENCOUNTER — APPOINTMENT (OUTPATIENT)
Dept: DERMATOLOGY | Facility: CLINIC | Age: 61
End: 2021-11-29
Payer: MEDICAID

## 2021-11-29 PROCEDURE — 99214 OFFICE O/P EST MOD 30 MIN: CPT | Mod: 25

## 2021-11-29 PROCEDURE — 36415 COLL VENOUS BLD VENIPUNCTURE: CPT

## 2021-11-29 PROCEDURE — 17110 DESTRUCTION B9 LES UP TO 14: CPT

## 2021-11-29 PROCEDURE — 11900 INJECT SKIN LESIONS </W 7: CPT | Mod: 59

## 2021-11-29 PROCEDURE — 17000 DESTRUCT PREMALG LESION: CPT | Mod: 59

## 2021-12-01 LAB
ALBUMIN SERPL ELPH-MCNC: 4.4 G/DL
ALP BLD-CCNC: 65 U/L
ALT SERPL-CCNC: 17 U/L
AST SERPL-CCNC: 18 U/L
BASOPHILS # BLD AUTO: 0.05 K/UL
BASOPHILS NFR BLD AUTO: 0.7 %
BILIRUB DIRECT SERPL-MCNC: 0.1 MG/DL
BILIRUB INDIRECT SERPL-MCNC: 0.2 MG/DL
BILIRUB SERPL-MCNC: 0.2 MG/DL
EOSINOPHIL # BLD AUTO: 0.19 K/UL
EOSINOPHIL NFR BLD AUTO: 2.7 %
HCT VFR BLD CALC: 44.7 %
HGB BLD-MCNC: 15 G/DL
IMM GRANULOCYTES NFR BLD AUTO: 0.3 %
LYMPHOCYTES # BLD AUTO: 2.35 K/UL
LYMPHOCYTES NFR BLD AUTO: 32.9 %
MAN DIFF?: NORMAL
MCHC RBC-ENTMCNC: 30.7 PG
MCHC RBC-ENTMCNC: 33.6 GM/DL
MCV RBC AUTO: 91.4 FL
MONOCYTES # BLD AUTO: 0.75 K/UL
MONOCYTES NFR BLD AUTO: 10.5 %
NEUTROPHILS # BLD AUTO: 3.78 K/UL
NEUTROPHILS NFR BLD AUTO: 52.9 %
PLATELET # BLD AUTO: 214 K/UL
PROT SERPL-MCNC: 7.3 G/DL
RBC # BLD: 4.89 M/UL
RBC # FLD: 12.6 %
WBC # FLD AUTO: 7.14 K/UL

## 2021-12-14 ENCOUNTER — NON-APPOINTMENT (OUTPATIENT)
Age: 61
End: 2021-12-14

## 2021-12-14 ENCOUNTER — TRANSCRIPTION ENCOUNTER (OUTPATIENT)
Age: 61
End: 2021-12-14

## 2021-12-15 ENCOUNTER — TRANSCRIPTION ENCOUNTER (OUTPATIENT)
Age: 61
End: 2021-12-15

## 2021-12-15 ENCOUNTER — NON-APPOINTMENT (OUTPATIENT)
Age: 61
End: 2021-12-15

## 2022-01-10 ENCOUNTER — APPOINTMENT (OUTPATIENT)
Dept: INTERNAL MEDICINE | Facility: CLINIC | Age: 62
End: 2022-01-10
Payer: MEDICAID

## 2022-01-10 PROCEDURE — 36415 COLL VENOUS BLD VENIPUNCTURE: CPT

## 2022-01-10 PROCEDURE — 90471 IMMUNIZATION ADMIN: CPT

## 2022-01-10 PROCEDURE — 90746 HEPB VACCINE 3 DOSE ADULT IM: CPT

## 2022-01-10 PROCEDURE — 99213 OFFICE O/P EST LOW 20 MIN: CPT | Mod: 25

## 2022-01-10 NOTE — PLAN
[FreeTextEntry1] : Hair thinning  \par with thyroid disease check thyroid level\par \par hep B #3 today

## 2022-01-10 NOTE — PHYSICAL EXAM
[Normal] : no acute distress, well nourished, well developed and well-appearing [de-identified] : hair thinning

## 2022-01-10 NOTE — HISTORY OF PRESENT ILLNESS
[de-identified] : 60 yo f with Hashimotos\par - has gained some weight  - normally very active - has been more lazy recently.  \par \par On MMF -  concerned about thyroid\par  here for hep b #3 today\par \par \par

## 2022-01-11 LAB
25(OH)D3 SERPL-MCNC: 46.4 NG/ML
ALBUMIN SERPL ELPH-MCNC: 4.4 G/DL
ALP BLD-CCNC: 63 U/L
ALT SERPL-CCNC: 22 U/L
ANION GAP SERPL CALC-SCNC: 16 MMOL/L
APPEARANCE: CLEAR
AST SERPL-CCNC: 20 U/L
BASOPHILS # BLD AUTO: 0.05 K/UL
BASOPHILS NFR BLD AUTO: 0.8 %
BILIRUB SERPL-MCNC: 0.3 MG/DL
BILIRUBIN URINE: NEGATIVE
BLOOD URINE: NEGATIVE
BUN SERPL-MCNC: 11 MG/DL
CALCIUM SERPL-MCNC: 9 MG/DL
CHLORIDE SERPL-SCNC: 102 MMOL/L
CHOLEST SERPL-MCNC: 190 MG/DL
CO2 SERPL-SCNC: 22 MMOL/L
COLOR: COLORLESS
CREAT SERPL-MCNC: 0.65 MG/DL
EOSINOPHIL # BLD AUTO: 0.16 K/UL
EOSINOPHIL NFR BLD AUTO: 2.6 %
ESTIMATED AVERAGE GLUCOSE: 103 MG/DL
GLUCOSE QUALITATIVE U: NEGATIVE
GLUCOSE SERPL-MCNC: 90 MG/DL
HBA1C MFR BLD HPLC: 5.2 %
HCT VFR BLD CALC: 42.7 %
HDLC SERPL-MCNC: 46 MG/DL
HGB BLD-MCNC: 14.1 G/DL
IMM GRANULOCYTES NFR BLD AUTO: 0.3 %
KETONES URINE: NEGATIVE
LDLC SERPL CALC-MCNC: 101 MG/DL
LEUKOCYTE ESTERASE URINE: NEGATIVE
LYMPHOCYTES # BLD AUTO: 2.04 K/UL
LYMPHOCYTES NFR BLD AUTO: 33.2 %
MAN DIFF?: NORMAL
MCHC RBC-ENTMCNC: 30.2 PG
MCHC RBC-ENTMCNC: 33 GM/DL
MCV RBC AUTO: 91.4 FL
MONOCYTES # BLD AUTO: 0.58 K/UL
MONOCYTES NFR BLD AUTO: 9.4 %
NEUTROPHILS # BLD AUTO: 3.29 K/UL
NEUTROPHILS NFR BLD AUTO: 53.7 %
NITRITE URINE: NEGATIVE
NONHDLC SERPL-MCNC: 144 MG/DL
PH URINE: 6
PLATELET # BLD AUTO: 207 K/UL
POTASSIUM SERPL-SCNC: 4.3 MMOL/L
PROT SERPL-MCNC: 7.4 G/DL
PROTEIN URINE: NEGATIVE
RBC # BLD: 4.67 M/UL
RBC # FLD: 13.2 %
SODIUM SERPL-SCNC: 140 MMOL/L
SPECIFIC GRAVITY URINE: 1.01
T4 FREE SERPL-MCNC: 0.8 NG/DL
TRIGL SERPL-MCNC: 214 MG/DL
TSH SERPL-ACNC: 1.19 UIU/ML
UROBILINOGEN URINE: NORMAL
WBC # FLD AUTO: 6.14 K/UL

## 2022-01-17 ENCOUNTER — NON-APPOINTMENT (OUTPATIENT)
Age: 62
End: 2022-01-17

## 2022-01-18 ENCOUNTER — TRANSCRIPTION ENCOUNTER (OUTPATIENT)
Age: 62
End: 2022-01-18

## 2022-02-07 ENCOUNTER — RX RENEWAL (OUTPATIENT)
Age: 62
End: 2022-02-07

## 2022-02-24 ENCOUNTER — RX RENEWAL (OUTPATIENT)
Age: 62
End: 2022-02-24

## 2022-02-28 ENCOUNTER — APPOINTMENT (OUTPATIENT)
Dept: DERMATOLOGY | Facility: CLINIC | Age: 62
End: 2022-02-28
Payer: MEDICAID

## 2022-02-28 PROCEDURE — 99214 OFFICE O/P EST MOD 30 MIN: CPT | Mod: 25

## 2022-02-28 PROCEDURE — 11900 INJECT SKIN LESIONS </W 7: CPT

## 2022-02-28 NOTE — PHYSICAL EXAM
[Alert] : alert [Oriented x 3] : ~L oriented x 3 [Well Nourished] : well nourished [Conjunctiva Non-injected] : conjunctiva non-injected [No Visual Lymphadenopathy] : no visual  lymphadenopathy [No Clubbing] : no clubbing [No Edema] : no edema [No Bromhidrosis] : no bromhidrosis [No Chromhidrosis] : no chromhidrosis [FreeTextEntry3] : stuck on tan plaques L temple and scalp\par no scalp erythema or scale\par significant several cm regrowth across entire superior scalp

## 2022-02-28 NOTE — HISTORY OF PRESENT ILLNESS
[FreeTextEntry1] : FU LPP [de-identified] : 60 yo F established patient here for above\par taking cellcept 2 pills po bid, no side effects\par not using topicals, scalp no longer red\par regrowing a lot of hair\par

## 2022-02-28 NOTE — HISTORY OF PRESENT ILLNESS
[FreeTextEntry1] : FU LPP [de-identified] : 62 yo F established patient here for above\par taking cellcept 2 pills po bid, no side effects\par mild cold last week resolved quickly\par using minoxidil in pm, fluocinonide in am\par sees short hair on top but not sure if broken, regrowth noted mostly frontal \par irritated growths on back\par \par \par hx SCC x 2 back >10 years ago, had cbe in July

## 2022-02-28 NOTE — PHYSICAL EXAM
[Alert] : alert [Oriented x 3] : ~L oriented x 3 [Well Nourished] : well nourished [Conjunctiva Non-injected] : conjunctiva non-injected [No Visual Lymphadenopathy] : no visual  lymphadenopathy [No Clubbing] : no clubbing [No Edema] : no edema [No Bromhidrosis] : no bromhidrosis [No Chromhidrosis] : no chromhidrosis [FreeTextEntry3] : scalp with thinning alopecia superior scalp with miniaturized hairs\par short significant regrowth along frontal hairline and superior parietal scalp bilaterally (photo taken from side)\par gritty pink papule L vertex\par stuck on tan plaques posterior scalp and lower back x 2\par 1 mm firm white papules cheeks and forehead

## 2022-03-07 ENCOUNTER — TRANSCRIPTION ENCOUNTER (OUTPATIENT)
Age: 62
End: 2022-03-07

## 2022-03-25 ENCOUNTER — TRANSCRIPTION ENCOUNTER (OUTPATIENT)
Age: 62
End: 2022-03-25

## 2022-03-31 ENCOUNTER — TRANSCRIPTION ENCOUNTER (OUTPATIENT)
Age: 62
End: 2022-03-31

## 2022-05-02 ENCOUNTER — NON-APPOINTMENT (OUTPATIENT)
Age: 62
End: 2022-05-02

## 2022-05-02 ENCOUNTER — TRANSCRIPTION ENCOUNTER (OUTPATIENT)
Age: 62
End: 2022-05-02

## 2022-05-03 ENCOUNTER — TRANSCRIPTION ENCOUNTER (OUTPATIENT)
Age: 62
End: 2022-05-03

## 2022-05-03 ENCOUNTER — APPOINTMENT (OUTPATIENT)
Dept: ORTHOPEDIC SURGERY | Facility: CLINIC | Age: 62
End: 2022-05-03
Payer: MEDICAID

## 2022-05-03 VITALS — WEIGHT: 145 LBS | HEIGHT: 63 IN | BODY MASS INDEX: 25.69 KG/M2

## 2022-05-03 DIAGNOSIS — S76.312A STRAIN OF MUSCLE, FASCIA AND TENDON OF THE POSTERIOR MUSCLE GROUP AT THIGH LEVEL, LEFT THIGH, INITIAL ENCOUNTER: ICD-10-CM

## 2022-05-03 PROCEDURE — 99203 OFFICE O/P NEW LOW 30 MIN: CPT

## 2022-05-04 ENCOUNTER — APPOINTMENT (OUTPATIENT)
Dept: DERMATOLOGY | Facility: CLINIC | Age: 62
End: 2022-05-04
Payer: MEDICAID

## 2022-05-04 ENCOUNTER — TRANSCRIPTION ENCOUNTER (OUTPATIENT)
Age: 62
End: 2022-05-04

## 2022-05-04 PROCEDURE — 99214 OFFICE O/P EST MOD 30 MIN: CPT

## 2022-05-04 NOTE — PHYSICAL EXAM
[Alert] : alert [Oriented x 3] : ~L oriented x 3 [Well Nourished] : well nourished [Conjunctiva Non-injected] : conjunctiva non-injected [No Visual Lymphadenopathy] : no visual  lymphadenopathy [No Clubbing] : no clubbing [No Edema] : no edema [No Bromhidrosis] : no bromhidrosis [No Chromhidrosis] : no chromhidrosis [FreeTextEntry3] : stuck on tan papules superior scalp\par no scalp erythema or scale\par significant several cm regrowth across entire superior scalp

## 2022-05-04 NOTE — HISTORY OF PRESENT ILLNESS
[FreeTextEntry1] : FU LPP [de-identified] : 60 yo F established patient here for above\par taking cellcept 2 pills po bid, no side effects\par not using topicals, scalp asymptomatic\par regrowing a lot of hair\par

## 2022-05-05 ENCOUNTER — APPOINTMENT (OUTPATIENT)
Dept: OTOLARYNGOLOGY | Facility: CLINIC | Age: 62
End: 2022-05-05
Payer: MEDICAID

## 2022-05-05 ENCOUNTER — APPOINTMENT (OUTPATIENT)
Dept: ORTHOPEDIC SURGERY | Facility: CLINIC | Age: 62
End: 2022-05-05
Payer: MEDICAID

## 2022-05-05 VITALS — RESPIRATION RATE: 16 BRPM | WEIGHT: 145 LBS | HEIGHT: 63 IN | BODY MASS INDEX: 25.69 KG/M2

## 2022-05-05 VITALS
HEART RATE: 69 BPM | DIASTOLIC BLOOD PRESSURE: 65 MMHG | HEIGHT: 63 IN | OXYGEN SATURATION: 96 % | SYSTOLIC BLOOD PRESSURE: 105 MMHG | BODY MASS INDEX: 26.58 KG/M2 | WEIGHT: 150 LBS | TEMPERATURE: 98.1 F

## 2022-05-05 DIAGNOSIS — J01.91 ACUTE RECURRENT SINUSITIS, UNSPECIFIED: ICD-10-CM

## 2022-05-05 DIAGNOSIS — S02.2XXA FRACTURE OF NASAL BONES, INITIAL ENCOUNTER FOR CLOSED FRACTURE: ICD-10-CM

## 2022-05-05 DIAGNOSIS — J30.2 OTHER SEASONAL ALLERGIC RHINITIS: ICD-10-CM

## 2022-05-05 LAB
ALBUMIN SERPL ELPH-MCNC: 4.5 G/DL
ALP BLD-CCNC: 57 U/L
ALT SERPL-CCNC: 16 U/L
ANION GAP SERPL CALC-SCNC: 16 MMOL/L
AST SERPL-CCNC: 18 U/L
BASOPHILS # BLD AUTO: 0.05 K/UL
BASOPHILS NFR BLD AUTO: 0.7 %
BILIRUB SERPL-MCNC: 0.2 MG/DL
BUN SERPL-MCNC: 17 MG/DL
CALCIUM SERPL-MCNC: 9.7 MG/DL
CHLORIDE SERPL-SCNC: 102 MMOL/L
CO2 SERPL-SCNC: 22 MMOL/L
CREAT SERPL-MCNC: 0.74 MG/DL
EGFR: 91 ML/MIN/1.73M2
EOSINOPHIL # BLD AUTO: 0.21 K/UL
EOSINOPHIL NFR BLD AUTO: 3.1 %
GLUCOSE SERPL-MCNC: 110 MG/DL
HCT VFR BLD CALC: 42.3 %
HGB BLD-MCNC: 14.2 G/DL
IMM GRANULOCYTES NFR BLD AUTO: 0.1 %
LYMPHOCYTES # BLD AUTO: 1.85 K/UL
LYMPHOCYTES NFR BLD AUTO: 27 %
MAN DIFF?: NORMAL
MCHC RBC-ENTMCNC: 29.8 PG
MCHC RBC-ENTMCNC: 33.6 GM/DL
MCV RBC AUTO: 88.9 FL
MONOCYTES # BLD AUTO: 0.78 K/UL
MONOCYTES NFR BLD AUTO: 11.4 %
NEUTROPHILS # BLD AUTO: 3.94 K/UL
NEUTROPHILS NFR BLD AUTO: 57.7 %
PLATELET # BLD AUTO: 214 K/UL
POTASSIUM SERPL-SCNC: 4.2 MMOL/L
PROT SERPL-MCNC: 7.5 G/DL
RBC # BLD: 4.76 M/UL
RBC # FLD: 12.8 %
SODIUM SERPL-SCNC: 140 MMOL/L
WBC # FLD AUTO: 6.84 K/UL

## 2022-05-05 PROCEDURE — 99213 OFFICE O/P EST LOW 20 MIN: CPT

## 2022-05-05 PROCEDURE — 73110 X-RAY EXAM OF WRIST: CPT | Mod: 50

## 2022-05-05 PROCEDURE — 99203 OFFICE O/P NEW LOW 30 MIN: CPT

## 2022-05-05 NOTE — DATA REVIEWED
[de-identified] : CT max-fac report from pt's phone: mildly comminuted, minimally displaced R nasal bone fx

## 2022-05-05 NOTE — HISTORY OF PRESENT ILLNESS
[de-identified] : Fell down 6d ago directly forward on her nose d/t a torn hamstring; an outside CT showed a nasal fx. Her breathing is ok but she notices slight changes at the R midvault and possibly a larger hump. \nathan Had an SRP in '84 and another in '91; nevertheless has chronic baseline intermittent trouble breathing through her nose bora in response to elm trees & bouquets; has a amanda apt. Uses flonase daily.\par Prior to masking she had ~3 sinus infections/yr for all her adult life; none recently.

## 2022-05-05 NOTE — ASSESSMENT
[FreeTextEntry1] : Discussed options & she declines having the nose set now; RTC 4-5 months if she's still concerned. \par \par Discussed allergen mitigation and provided the patient with the corresponding educational handout; reviewed proper nasal steroid administration technique.\par \par RTC for further sinus infections.

## 2022-05-05 NOTE — PHYSICAL EXAM
[de-identified] : mildly collapsed R midvault confirmed comparing to old pics on her phone [Normal] : assessment of respiratory effort is normal

## 2022-05-05 NOTE — CONSULT LETTER
[Dear  ___] : Dear  [unfilled], [Consult Letter:] : I had the pleasure of evaluating your patient, [unfilled]. [Please see my note below.] : Please see my note below. [Consult Closing:] : Thank you very much for allowing me to participate in the care of this patient.  If you have any questions, please do not hesitate to contact me. [Sincerely,] : Sincerely, [FreeTextEntry3] : BOAZ Jackson Jr, MD, FAAOHNS\par Otolaryngologist\par [facility]

## 2022-05-06 LAB
M TB IFN-G BLD-IMP: NEGATIVE
QUANTIFERON TB PLUS MITOGEN MINUS NIL: 8.48 IU/ML
QUANTIFERON TB PLUS NIL: 0.03 IU/ML
QUANTIFERON TB PLUS TB1 MINUS NIL: 0.02 IU/ML
QUANTIFERON TB PLUS TB2 MINUS NIL: 0.01 IU/ML

## 2022-05-09 ENCOUNTER — TRANSCRIPTION ENCOUNTER (OUTPATIENT)
Age: 62
End: 2022-05-09

## 2022-05-10 ENCOUNTER — TRANSCRIPTION ENCOUNTER (OUTPATIENT)
Age: 62
End: 2022-05-10

## 2022-05-12 ENCOUNTER — APPOINTMENT (OUTPATIENT)
Dept: ORTHOPEDIC SURGERY | Facility: CLINIC | Age: 62
End: 2022-05-12
Payer: MEDICAID

## 2022-05-12 PROCEDURE — ZZZZZ: CPT

## 2022-05-17 ENCOUNTER — APPOINTMENT (OUTPATIENT)
Dept: ORTHOPEDIC SURGERY | Facility: CLINIC | Age: 62
End: 2022-05-17
Payer: MEDICAID

## 2022-05-17 DIAGNOSIS — S62.015D NONDISPLACED FRACTURE OF DISTAL POLE OF NAVICULAR [SCAPHOID] BONE OF LEFT WRIST, SUBSEQUENT ENCOUNTER FOR FRACTURE WITH ROUTINE HEALING: ICD-10-CM

## 2022-05-17 PROCEDURE — 99214 OFFICE O/P EST MOD 30 MIN: CPT

## 2022-05-18 ENCOUNTER — LABORATORY RESULT (OUTPATIENT)
Age: 62
End: 2022-05-18

## 2022-05-18 ENCOUNTER — NON-APPOINTMENT (OUTPATIENT)
Age: 62
End: 2022-05-18

## 2022-05-18 ENCOUNTER — TRANSCRIPTION ENCOUNTER (OUTPATIENT)
Age: 62
End: 2022-05-18

## 2022-05-19 ENCOUNTER — TRANSCRIPTION ENCOUNTER (OUTPATIENT)
Age: 62
End: 2022-05-19

## 2022-05-19 LAB
ALBUMIN SERPL ELPH-MCNC: 4.6 G/DL
ALP BLD-CCNC: 60 U/L
ALT SERPL-CCNC: 17 U/L
ANION GAP SERPL CALC-SCNC: 12 MMOL/L
APPEARANCE: CLEAR
APTT BLD: 28.1 SEC
AST SERPL-CCNC: 17 U/L
BASOPHILS # BLD AUTO: 0.06 K/UL
BASOPHILS NFR BLD AUTO: 0.9 %
BILIRUB SERPL-MCNC: 0.4 MG/DL
BILIRUBIN URINE: NEGATIVE
BLOOD URINE: NEGATIVE
BUN SERPL-MCNC: 15 MG/DL
CALCIUM SERPL-MCNC: 9.4 MG/DL
CHLORIDE SERPL-SCNC: 104 MMOL/L
CO2 SERPL-SCNC: 23 MMOL/L
COLOR: NORMAL
CREAT SERPL-MCNC: 0.63 MG/DL
EGFR: 100 ML/MIN/1.73M2
EOSINOPHIL # BLD AUTO: 0.19 K/UL
EOSINOPHIL NFR BLD AUTO: 2.7 %
GLUCOSE QUALITATIVE U: NEGATIVE
GLUCOSE SERPL-MCNC: 83 MG/DL
HCT VFR BLD CALC: 42.2 %
HGB BLD-MCNC: 14.4 G/DL
IMM GRANULOCYTES NFR BLD AUTO: 0.1 %
INR PPP: 0.98 RATIO
KETONES URINE: NEGATIVE
LEUKOCYTE ESTERASE URINE: ABNORMAL
LYMPHOCYTES # BLD AUTO: 2.17 K/UL
LYMPHOCYTES NFR BLD AUTO: 31 %
MAN DIFF?: NORMAL
MCHC RBC-ENTMCNC: 30.1 PG
MCHC RBC-ENTMCNC: 34.1 GM/DL
MCV RBC AUTO: 88.1 FL
MONOCYTES # BLD AUTO: 0.86 K/UL
MONOCYTES NFR BLD AUTO: 12.3 %
NEUTROPHILS # BLD AUTO: 3.71 K/UL
NEUTROPHILS NFR BLD AUTO: 53 %
NITRITE URINE: NEGATIVE
PH URINE: 6
PLATELET # BLD AUTO: 233 K/UL
POTASSIUM SERPL-SCNC: 4.3 MMOL/L
PROT SERPL-MCNC: 7.5 G/DL
PROTEIN URINE: NORMAL
PT BLD: 11.5 SEC
RBC # BLD: 4.79 M/UL
RBC # FLD: 12.3 %
SODIUM SERPL-SCNC: 139 MMOL/L
SPECIFIC GRAVITY URINE: 1.03
T3 SERPL-MCNC: 119 NG/DL
T3FREE SERPL-MCNC: 3.03 PG/ML
T3RU NFR SERPL: 1.1 TBI
T4 FREE SERPL-MCNC: 0.9 NG/DL
T4 SERPL-MCNC: 5.4 UG/DL
THYROGLOB AB SERPL-ACNC: <20 IU/ML
THYROPEROXIDASE AB SERPL IA-ACNC: 39.4 IU/ML
TSH SERPL-ACNC: 1.33 UIU/ML
UROBILINOGEN URINE: NORMAL
WBC # FLD AUTO: 7 K/UL

## 2022-05-22 ENCOUNTER — NON-APPOINTMENT (OUTPATIENT)
Age: 62
End: 2022-05-22

## 2022-05-24 ENCOUNTER — APPOINTMENT (OUTPATIENT)
Dept: ORTHOPEDIC SURGERY | Facility: CLINIC | Age: 62
End: 2022-05-24
Payer: MEDICAID

## 2022-05-24 PROCEDURE — 73110 X-RAY EXAM OF WRIST: CPT | Mod: 50

## 2022-05-24 PROCEDURE — 99213 OFFICE O/P EST LOW 20 MIN: CPT

## 2022-06-01 ENCOUNTER — APPOINTMENT (OUTPATIENT)
Dept: INTERNAL MEDICINE | Facility: CLINIC | Age: 62
End: 2022-06-01
Payer: MEDICAID

## 2022-06-01 VITALS
WEIGHT: 150 LBS | HEIGHT: 63 IN | DIASTOLIC BLOOD PRESSURE: 74 MMHG | HEART RATE: 68 BPM | OXYGEN SATURATION: 98 % | SYSTOLIC BLOOD PRESSURE: 110 MMHG | TEMPERATURE: 96.8 F | RESPIRATION RATE: 16 BRPM | BODY MASS INDEX: 26.58 KG/M2

## 2022-06-01 DIAGNOSIS — Z01.818 ENCOUNTER FOR OTHER PREPROCEDURAL EXAMINATION: ICD-10-CM

## 2022-06-01 PROCEDURE — 93000 ELECTROCARDIOGRAM COMPLETE: CPT

## 2022-06-01 PROCEDURE — 99214 OFFICE O/P EST MOD 30 MIN: CPT

## 2022-06-01 NOTE — HISTORY OF PRESENT ILLNESS
[No Pertinent Cardiac History] : no history of aortic stenosis, atrial fibrillation, coronary artery disease, recent myocardial infarction, or implantable device/pacemaker [No Pertinent Pulmonary History] : no history of asthma, COPD, sleep apnea, or smoking [No Adverse Anesthesia Reaction] : no adverse anesthesia reaction in self or family member [(Patient denies any chest pain, claudication, dyspnea on exertion, orthopnea, palpitations or syncope)] : Patient denies any chest pain, claudication, dyspnea on exertion, orthopnea, palpitations or syncope [Good (7-10 METs)] : Good (7-10 METs) [FreeTextEntry1] : left hamstring surgery [FreeTextEntry2] : 6/8/22 [FreeTextEntry3] : Dr Mendieta [FreeTextEntry4] : 60 yo f with hx of Hashimoto's thyroiditis \par  left hamstring rupture.\par

## 2022-06-06 ENCOUNTER — LABORATORY RESULT (OUTPATIENT)
Age: 62
End: 2022-06-06

## 2022-06-07 RX ORDER — MELOXICAM 15 MG/1
15 TABLET ORAL
Qty: 15 | Refills: 0 | Status: ACTIVE | COMMUNITY
Start: 2022-06-07 | End: 1900-01-01

## 2022-06-07 RX ORDER — TRAMADOL HYDROCHLORIDE 50 MG/1
50 TABLET, COATED ORAL
Qty: 5 | Refills: 0 | Status: ACTIVE | COMMUNITY
Start: 2022-06-07 | End: 1900-01-01

## 2022-06-07 RX ORDER — METHOCARBAMOL 500 MG/1
500 TABLET, FILM COATED ORAL EVERY 8 HOURS
Qty: 30 | Refills: 0 | Status: ACTIVE | COMMUNITY
Start: 2022-06-07 | End: 1900-01-01

## 2022-06-07 RX ORDER — ASPIRIN ENTERIC COATED TABLETS 81 MG 81 MG/1
81 TABLET, DELAYED RELEASE ORAL DAILY
Qty: 28 | Refills: 0 | Status: ACTIVE | COMMUNITY
Start: 2022-06-07 | End: 1900-01-01

## 2022-06-07 RX ORDER — GABAPENTIN 100 MG/1
100 CAPSULE ORAL EVERY 8 HOURS
Qty: 60 | Refills: 0 | Status: ACTIVE | COMMUNITY
Start: 2022-06-07 | End: 1900-01-01

## 2022-06-08 ENCOUNTER — APPOINTMENT (OUTPATIENT)
Age: 62
End: 2022-06-08

## 2022-06-08 ENCOUNTER — TRANSCRIPTION ENCOUNTER (OUTPATIENT)
Age: 62
End: 2022-06-08

## 2022-06-08 PROCEDURE — 64712 REVISION OF SCIATIC NERVE: CPT | Mod: LT

## 2022-06-08 PROCEDURE — 27385 REPAIR OF THIGH MUSCLE: CPT | Mod: LT

## 2022-06-09 ENCOUNTER — TRANSCRIPTION ENCOUNTER (OUTPATIENT)
Age: 62
End: 2022-06-09

## 2022-06-13 PROBLEM — M25.539 PAIN IN THE WRIST: Status: ACTIVE | Noted: 2022-05-03

## 2022-06-14 ENCOUNTER — APPOINTMENT (OUTPATIENT)
Dept: ORTHOPEDIC SURGERY | Facility: CLINIC | Age: 62
End: 2022-06-14
Payer: MEDICAID

## 2022-06-14 DIAGNOSIS — S69.90XA UNSPECIFIED INJURY OF UNSPECIFIED WRIST, HAND AND FINGER(S), INITIAL ENCOUNTER: ICD-10-CM

## 2022-06-14 DIAGNOSIS — M25.539 PAIN IN UNSPECIFIED WRIST: ICD-10-CM

## 2022-06-14 PROCEDURE — 73110 X-RAY EXAM OF WRIST: CPT | Mod: RT

## 2022-06-14 PROCEDURE — 99213 OFFICE O/P EST LOW 20 MIN: CPT

## 2022-06-14 PROCEDURE — 99024 POSTOP FOLLOW-UP VISIT: CPT

## 2022-06-14 NOTE — HISTORY OF PRESENT ILLNESS
[de-identified] : 6 days status post open left proximal hamstring repair\par \par Patient doing well denies fevers or chills.  Here for bandage change\par Pain well controlled\par Starting PT today [de-identified] : Well-appearing\par Incision clean dry intact\par Bandage replaced\par No numbness or tingling\par No calf swelling [de-identified] : 61-year-old female 6 days status post open left proximal hamstring repair doing well\par \par Start PT\par Follow-up in 6 weeks

## 2022-06-18 ENCOUNTER — TRANSCRIPTION ENCOUNTER (OUTPATIENT)
Age: 62
End: 2022-06-18

## 2022-06-21 ENCOUNTER — APPOINTMENT (OUTPATIENT)
Dept: ORTHOPEDIC SURGERY | Facility: CLINIC | Age: 62
End: 2022-06-21
Payer: MEDICAID

## 2022-06-21 PROCEDURE — 99024 POSTOP FOLLOW-UP VISIT: CPT

## 2022-07-08 ENCOUNTER — NON-APPOINTMENT (OUTPATIENT)
Age: 62
End: 2022-07-08

## 2022-07-11 ENCOUNTER — TRANSCRIPTION ENCOUNTER (OUTPATIENT)
Age: 62
End: 2022-07-11

## 2022-07-11 DIAGNOSIS — L73.9 FOLLICULAR DISORDER, UNSPECIFIED: ICD-10-CM

## 2022-07-11 RX ORDER — BENZOYL PEROXIDE 5 G/100G
5 LIQUID TOPICAL
Qty: 1 | Refills: 4 | Status: ACTIVE | COMMUNITY
Start: 2022-07-11 | End: 1900-01-01

## 2022-07-11 RX ORDER — CLINDAMYCIN PHOSPHATE 10 MG/ML
1 LOTION TOPICAL
Qty: 1 | Refills: 6 | Status: DISCONTINUED | COMMUNITY
Start: 2022-07-11 | End: 2022-07-11

## 2022-07-20 ENCOUNTER — RX RENEWAL (OUTPATIENT)
Age: 62
End: 2022-07-20

## 2022-08-02 ENCOUNTER — APPOINTMENT (OUTPATIENT)
Dept: ORTHOPEDIC SURGERY | Facility: CLINIC | Age: 62
End: 2022-08-02

## 2022-08-02 VITALS — HEIGHT: 63 IN | WEIGHT: 150 LBS | BODY MASS INDEX: 26.58 KG/M2

## 2022-08-02 PROCEDURE — 99024 POSTOP FOLLOW-UP VISIT: CPT

## 2022-08-08 ENCOUNTER — TRANSCRIPTION ENCOUNTER (OUTPATIENT)
Age: 62
End: 2022-08-08

## 2022-08-10 ENCOUNTER — RX RENEWAL (OUTPATIENT)
Age: 62
End: 2022-08-10

## 2022-08-10 RX ORDER — CLINDAMYCIN PHOSPHATE 1 G/10ML
1 GEL TOPICAL
Qty: 60 | Refills: 11 | Status: ACTIVE | COMMUNITY
Start: 2022-07-11 | End: 1900-01-01

## 2022-08-12 ENCOUNTER — NON-APPOINTMENT (OUTPATIENT)
Age: 62
End: 2022-08-12

## 2022-08-19 ENCOUNTER — RX RENEWAL (OUTPATIENT)
Age: 62
End: 2022-08-19

## 2022-09-07 ENCOUNTER — NON-APPOINTMENT (OUTPATIENT)
Age: 62
End: 2022-09-07

## 2022-09-12 ENCOUNTER — APPOINTMENT (OUTPATIENT)
Dept: DERMATOLOGY | Facility: CLINIC | Age: 62
End: 2022-09-12

## 2022-09-12 DIAGNOSIS — L73.8 OTHER SPECIFIED FOLLICULAR DISORDERS: ICD-10-CM

## 2022-09-12 PROCEDURE — 17000 DESTRUCT PREMALG LESION: CPT | Mod: 59

## 2022-09-12 PROCEDURE — 17110 DESTRUCTION B9 LES UP TO 14: CPT

## 2022-09-12 PROCEDURE — 99214 OFFICE O/P EST MOD 30 MIN: CPT | Mod: 25

## 2022-09-12 PROCEDURE — 17003 DESTRUCT PREMALG LES 2-14: CPT | Mod: 59

## 2022-09-12 NOTE — HISTORY OF PRESENT ILLNESS
[FreeTextEntry1] : FU LPP [de-identified] : 61 yo F established patient here for above\par taking cellcept 2 pills po bid, no side effects\par not using topicals, scalp asymptomatic\par regrowing a lot of hair\par

## 2022-09-12 NOTE — PHYSICAL EXAM
[Alert] : alert [Oriented x 3] : ~L oriented x 3 [Well Nourished] : well nourished [Conjunctiva Non-injected] : conjunctiva non-injected [No Visual Lymphadenopathy] : no visual  lymphadenopathy [No Clubbing] : no clubbing [No Edema] : no edema [No Bromhidrosis] : no bromhidrosis [No Chromhidrosis] : no chromhidrosis [FreeTextEntry3] : stuck on tan papules R shoulder\par no scalp erythema or scale\par significant several cm regrowth across entire superior scalp\par few gritty papules superior scalp

## 2022-09-14 LAB
ALBUMIN SERPL ELPH-MCNC: 4.4 G/DL
ALP BLD-CCNC: 61 U/L
ALT SERPL-CCNC: 16 U/L
ANION GAP SERPL CALC-SCNC: 13 MMOL/L
AST SERPL-CCNC: 18 U/L
BASOPHILS # BLD AUTO: 0.04 K/UL
BASOPHILS NFR BLD AUTO: 0.6 %
BILIRUB SERPL-MCNC: 0.2 MG/DL
BUN SERPL-MCNC: 15 MG/DL
CALCIUM SERPL-MCNC: 9.3 MG/DL
CHLORIDE SERPL-SCNC: 104 MMOL/L
CO2 SERPL-SCNC: 23 MMOL/L
CREAT SERPL-MCNC: 0.57 MG/DL
EGFR: 101 ML/MIN/1.73M2
EOSINOPHIL # BLD AUTO: 0.16 K/UL
EOSINOPHIL NFR BLD AUTO: 2.2 %
GLUCOSE SERPL-MCNC: 109 MG/DL
HCT VFR BLD CALC: 41.9 %
HGB BLD-MCNC: 14.6 G/DL
IMM GRANULOCYTES NFR BLD AUTO: 0.3 %
LYMPHOCYTES # BLD AUTO: 2 K/UL
LYMPHOCYTES NFR BLD AUTO: 28.1 %
MAN DIFF?: NORMAL
MCHC RBC-ENTMCNC: 31.3 PG
MCHC RBC-ENTMCNC: 34.8 GM/DL
MCV RBC AUTO: 89.9 FL
MONOCYTES # BLD AUTO: 0.55 K/UL
MONOCYTES NFR BLD AUTO: 7.7 %
NEUTROPHILS # BLD AUTO: 4.36 K/UL
NEUTROPHILS NFR BLD AUTO: 61.1 %
PLATELET # BLD AUTO: 206 K/UL
POTASSIUM SERPL-SCNC: 4.1 MMOL/L
PROT SERPL-MCNC: 7 G/DL
RBC # BLD: 4.66 M/UL
RBC # FLD: 12.6 %
SODIUM SERPL-SCNC: 141 MMOL/L
WBC # FLD AUTO: 7.13 K/UL

## 2022-10-11 ENCOUNTER — RX RENEWAL (OUTPATIENT)
Age: 62
End: 2022-10-11

## 2022-11-02 ENCOUNTER — TRANSCRIPTION ENCOUNTER (OUTPATIENT)
Age: 62
End: 2022-11-02

## 2022-11-08 ENCOUNTER — APPOINTMENT (OUTPATIENT)
Dept: ORTHOPEDIC SURGERY | Facility: CLINIC | Age: 62
End: 2022-11-08
Payer: MEDICAID

## 2022-11-08 VITALS — RESPIRATION RATE: 16 BRPM | HEIGHT: 63 IN | WEIGHT: 150 LBS | BODY MASS INDEX: 26.58 KG/M2

## 2022-11-08 DIAGNOSIS — M79.643 PAIN IN UNSPECIFIED HAND: ICD-10-CM

## 2022-11-08 PROCEDURE — 99213 OFFICE O/P EST LOW 20 MIN: CPT

## 2022-11-08 PROCEDURE — 73130 X-RAY EXAM OF HAND: CPT | Mod: 50

## 2022-11-09 ENCOUNTER — TRANSCRIPTION ENCOUNTER (OUTPATIENT)
Age: 62
End: 2022-11-09

## 2022-11-28 ENCOUNTER — TRANSCRIPTION ENCOUNTER (OUTPATIENT)
Age: 62
End: 2022-11-28

## 2022-11-28 DIAGNOSIS — M25.561 PAIN IN RIGHT KNEE: ICD-10-CM

## 2022-12-02 DIAGNOSIS — M79.651 PAIN IN RIGHT THIGH: ICD-10-CM

## 2022-12-02 DIAGNOSIS — M79.604 PAIN IN RIGHT LEG: ICD-10-CM

## 2022-12-08 ENCOUNTER — TRANSCRIPTION ENCOUNTER (OUTPATIENT)
Age: 62
End: 2022-12-08

## 2022-12-08 DIAGNOSIS — U07.1 COVID-19: ICD-10-CM

## 2022-12-08 RX ORDER — NIRMATRELVIR AND RITONAVIR 300-100 MG
20 X 150 MG & KIT ORAL
Qty: 1 | Refills: 0 | Status: ACTIVE | COMMUNITY
Start: 2022-12-08 | End: 1900-01-01

## 2022-12-14 ENCOUNTER — TRANSCRIPTION ENCOUNTER (OUTPATIENT)
Age: 62
End: 2022-12-14

## 2022-12-19 ENCOUNTER — TRANSCRIPTION ENCOUNTER (OUTPATIENT)
Age: 62
End: 2022-12-19

## 2022-12-21 ENCOUNTER — TRANSCRIPTION ENCOUNTER (OUTPATIENT)
Age: 62
End: 2022-12-21

## 2023-01-06 ENCOUNTER — TRANSCRIPTION ENCOUNTER (OUTPATIENT)
Age: 63
End: 2023-01-06

## 2023-01-12 ENCOUNTER — RX RENEWAL (OUTPATIENT)
Age: 63
End: 2023-01-12

## 2023-01-18 ENCOUNTER — TRANSCRIPTION ENCOUNTER (OUTPATIENT)
Age: 63
End: 2023-01-18

## 2023-01-20 ENCOUNTER — TRANSCRIPTION ENCOUNTER (OUTPATIENT)
Age: 63
End: 2023-01-20

## 2023-01-25 ENCOUNTER — TRANSCRIPTION ENCOUNTER (OUTPATIENT)
Age: 63
End: 2023-01-25

## 2023-01-27 ENCOUNTER — TRANSCRIPTION ENCOUNTER (OUTPATIENT)
Age: 63
End: 2023-01-27

## 2023-01-28 ENCOUNTER — TRANSCRIPTION ENCOUNTER (OUTPATIENT)
Age: 63
End: 2023-01-28

## 2023-01-30 ENCOUNTER — APPOINTMENT (OUTPATIENT)
Dept: DERMATOLOGY | Facility: CLINIC | Age: 63
End: 2023-01-30
Payer: MEDICAID

## 2023-01-30 PROCEDURE — 99214 OFFICE O/P EST MOD 30 MIN: CPT | Mod: 25

## 2023-01-30 PROCEDURE — 17003 DESTRUCT PREMALG LES 2-14: CPT | Mod: 59

## 2023-01-30 PROCEDURE — 17110 DESTRUCTION B9 LES UP TO 14: CPT

## 2023-01-30 PROCEDURE — 17000 DESTRUCT PREMALG LESION: CPT | Mod: 59

## 2023-01-31 ENCOUNTER — APPOINTMENT (OUTPATIENT)
Dept: ORTHOPEDIC SURGERY | Facility: CLINIC | Age: 63
End: 2023-01-31
Payer: MEDICAID

## 2023-01-31 DIAGNOSIS — S76.312D STRAIN OF MUSCLE, FASCIA AND TENDON OF THE POSTERIOR MUSCLE GROUP AT THIGH LEVEL, LEFT THIGH, SUBSEQUENT ENCOUNTER: ICD-10-CM

## 2023-01-31 DIAGNOSIS — S76.311A STRAIN OF MUSCLE, FASCIA AND TENDON OF THE POSTERIOR MUSCLE GROUP AT THIGH LEVEL, RIGHT THIGH, INITIAL ENCOUNTER: ICD-10-CM

## 2023-01-31 LAB
ALBUMIN SERPL ELPH-MCNC: 4.6 G/DL
ALP BLD-CCNC: 67 U/L
ALT SERPL-CCNC: 19 U/L
ANION GAP SERPL CALC-SCNC: 13 MMOL/L
AST SERPL-CCNC: 21 U/L
BASOPHILS # BLD AUTO: 0.04 K/UL
BASOPHILS NFR BLD AUTO: 0.4 %
BILIRUB SERPL-MCNC: 0.2 MG/DL
BUN SERPL-MCNC: 20 MG/DL
CALCIUM SERPL-MCNC: 9.5 MG/DL
CHLORIDE SERPL-SCNC: 102 MMOL/L
CO2 SERPL-SCNC: 24 MMOL/L
CREAT SERPL-MCNC: 0.63 MG/DL
EGFR: 99 ML/MIN/1.73M2
EOSINOPHIL # BLD AUTO: 0.27 K/UL
EOSINOPHIL NFR BLD AUTO: 3 %
GLUCOSE SERPL-MCNC: 96 MG/DL
HCT VFR BLD CALC: 43.7 %
HGB BLD-MCNC: 14.6 G/DL
IMM GRANULOCYTES NFR BLD AUTO: 0.6 %
LYMPHOCYTES # BLD AUTO: 2.22 K/UL
LYMPHOCYTES NFR BLD AUTO: 24.9 %
MAN DIFF?: NORMAL
MCHC RBC-ENTMCNC: 30 PG
MCHC RBC-ENTMCNC: 33.4 GM/DL
MCV RBC AUTO: 89.9 FL
MONOCYTES # BLD AUTO: 0.78 K/UL
MONOCYTES NFR BLD AUTO: 8.8 %
NEUTROPHILS # BLD AUTO: 5.55 K/UL
NEUTROPHILS NFR BLD AUTO: 62.3 %
PLATELET # BLD AUTO: 200 K/UL
POTASSIUM SERPL-SCNC: 4.2 MMOL/L
PROT SERPL-MCNC: 7.6 G/DL
RBC # BLD: 4.86 M/UL
RBC # FLD: 13.1 %
SODIUM SERPL-SCNC: 139 MMOL/L
WBC # FLD AUTO: 8.91 K/UL

## 2023-01-31 PROCEDURE — 99214 OFFICE O/P EST MOD 30 MIN: CPT

## 2023-01-31 NOTE — PHYSICAL EXAM
[Alert] : alert [Oriented x 3] : ~L oriented x 3 [Well Nourished] : well nourished [Conjunctiva Non-injected] : conjunctiva non-injected [No Visual Lymphadenopathy] : no visual  lymphadenopathy [No Clubbing] : no clubbing [No Edema] : no edema [No Bromhidrosis] : no bromhidrosis [No Chromhidrosis] : no chromhidrosis [Full Body Skin Exam Performed] : performed [FreeTextEntry3] : hyperkeratotic papules scalp\par no follicular erythema or scale on scalp\par gritty pink papules L shoulder\par depigmented scars back\par stuck on papule right shoulder

## 2023-01-31 NOTE — HISTORY OF PRESENT ILLNESS
[FreeTextEntry1] : FU LPP [de-identified] : 61 yo F established patient here for above\par taking cellcept 2 pills po bid, no side effects\par not using topicals, scalp asymptomatic\par hairloss stable\par \par due for CBE, hx SCC x 2\par

## 2023-02-03 LAB
M TB IFN-G BLD-IMP: NEGATIVE
QUANTIFERON TB PLUS MITOGEN MINUS NIL: 9.09 IU/ML
QUANTIFERON TB PLUS NIL: 0.02 IU/ML
QUANTIFERON TB PLUS TB1 MINUS NIL: 0 IU/ML
QUANTIFERON TB PLUS TB2 MINUS NIL: 0.01 IU/ML

## 2023-02-23 ENCOUNTER — TRANSCRIPTION ENCOUNTER (OUTPATIENT)
Age: 63
End: 2023-02-23

## 2023-02-23 DIAGNOSIS — R19.7 DIARRHEA, UNSPECIFIED: ICD-10-CM

## 2023-02-25 ENCOUNTER — TRANSCRIPTION ENCOUNTER (OUTPATIENT)
Age: 63
End: 2023-02-25

## 2023-02-26 LAB — BACTERIA STL CULT: NORMAL

## 2023-02-27 ENCOUNTER — TRANSCRIPTION ENCOUNTER (OUTPATIENT)
Age: 63
End: 2023-02-27

## 2023-02-27 LAB — DEPRECATED O AND P PREP STL: NORMAL

## 2023-03-13 ENCOUNTER — APPOINTMENT (OUTPATIENT)
Dept: DERMATOLOGY | Facility: CLINIC | Age: 63
End: 2023-03-13

## 2023-04-03 ENCOUNTER — RX RENEWAL (OUTPATIENT)
Age: 63
End: 2023-04-03

## 2023-04-12 ENCOUNTER — NON-APPOINTMENT (OUTPATIENT)
Age: 63
End: 2023-04-12

## 2023-05-14 ENCOUNTER — NON-APPOINTMENT (OUTPATIENT)
Age: 63
End: 2023-05-14

## 2023-05-15 ENCOUNTER — RX RENEWAL (OUTPATIENT)
Age: 63
End: 2023-05-15

## 2023-05-18 ENCOUNTER — NON-APPOINTMENT (OUTPATIENT)
Age: 63
End: 2023-05-18

## 2023-05-19 ENCOUNTER — NON-APPOINTMENT (OUTPATIENT)
Age: 63
End: 2023-05-19

## 2023-05-22 ENCOUNTER — APPOINTMENT (OUTPATIENT)
Dept: DERMATOLOGY | Facility: CLINIC | Age: 63
End: 2023-05-22
Payer: MEDICAID

## 2023-05-22 PROCEDURE — 17003 DESTRUCT PREMALG LES 2-14: CPT | Mod: 59

## 2023-05-22 PROCEDURE — 17000 DESTRUCT PREMALG LESION: CPT | Mod: 59

## 2023-05-22 PROCEDURE — 17110 DESTRUCTION B9 LES UP TO 14: CPT

## 2023-05-22 PROCEDURE — 36415 COLL VENOUS BLD VENIPUNCTURE: CPT

## 2023-05-22 PROCEDURE — 99214 OFFICE O/P EST MOD 30 MIN: CPT | Mod: 25

## 2023-05-22 NOTE — PHYSICAL EXAM
[Alert] : alert [Oriented x 3] : ~L oriented x 3 [Well Nourished] : well nourished [Conjunctiva Non-injected] : conjunctiva non-injected [No Visual Lymphadenopathy] : no visual  lymphadenopathy [No Clubbing] : no clubbing [No Edema] : no edema [No Bromhidrosis] : no bromhidrosis [No Chromhidrosis] : no chromhidrosis [FreeTextEntry3] : hyperkeratotic papules scalp\par no follicular erythema or scale on scalp\par stuck on papule right chest

## 2023-05-22 NOTE — HISTORY OF PRESENT ILLNESS
[FreeTextEntry1] : FU LPP, AKs [de-identified] : 63 yo F established patient here for above\par taking cellcept 2 pills po bid, no side effects\par not using topicals, scalp asymptomatic\par hairloss stable\par \par Jan 2023 last CBE, hx SCC x 2\par also hx AKs\par

## 2023-05-24 LAB
ALBUMIN SERPL ELPH-MCNC: 4.5 G/DL
ALP BLD-CCNC: 58 U/L
ALT SERPL-CCNC: 19 U/L
ANION GAP SERPL CALC-SCNC: 12 MMOL/L
AST SERPL-CCNC: 28 U/L
BILIRUB SERPL-MCNC: 0.3 MG/DL
BUN SERPL-MCNC: 13 MG/DL
CALCIUM SERPL-MCNC: 9.5 MG/DL
CHLORIDE SERPL-SCNC: 104 MMOL/L
CO2 SERPL-SCNC: 24 MMOL/L
CREAT SERPL-MCNC: 0.7 MG/DL
EGFR: 97 ML/MIN/1.73M2
GLUCOSE SERPL-MCNC: 85 MG/DL
POTASSIUM SERPL-SCNC: 4.5 MMOL/L
PROT SERPL-MCNC: 7.4 G/DL
SODIUM SERPL-SCNC: 140 MMOL/L

## 2023-06-05 ENCOUNTER — NON-APPOINTMENT (OUTPATIENT)
Age: 63
End: 2023-06-05

## 2023-06-06 ENCOUNTER — TRANSCRIPTION ENCOUNTER (OUTPATIENT)
Age: 63
End: 2023-06-06

## 2023-06-12 ENCOUNTER — TRANSCRIPTION ENCOUNTER (OUTPATIENT)
Age: 63
End: 2023-06-12

## 2023-06-12 RX ORDER — MYCOPHENOLATE MOFETIL 500 MG/1
500 TABLET ORAL
Qty: 120 | Refills: 3 | Status: COMPLETED | COMMUNITY
Start: 2022-05-23 | End: 2023-06-12

## 2023-06-14 ENCOUNTER — RX RENEWAL (OUTPATIENT)
Age: 63
End: 2023-06-14

## 2023-06-20 ENCOUNTER — TRANSCRIPTION ENCOUNTER (OUTPATIENT)
Age: 63
End: 2023-06-20

## 2023-06-21 ENCOUNTER — LABORATORY RESULT (OUTPATIENT)
Age: 63
End: 2023-06-21

## 2023-06-22 ENCOUNTER — TRANSCRIPTION ENCOUNTER (OUTPATIENT)
Age: 63
End: 2023-06-22

## 2023-06-22 LAB
ALBUMIN SERPL ELPH-MCNC: 4.5 G/DL
ALP BLD-CCNC: 54 U/L
ALT SERPL-CCNC: 20 U/L
ANION GAP SERPL CALC-SCNC: 13 MMOL/L
APPEARANCE: CLEAR
AST SERPL-CCNC: 23 U/L
BILIRUB SERPL-MCNC: 0.6 MG/DL
BILIRUBIN URINE: NEGATIVE
BLOOD URINE: NEGATIVE
BUN SERPL-MCNC: 11 MG/DL
CALCIUM SERPL-MCNC: 9.2 MG/DL
CHLORIDE SERPL-SCNC: 105 MMOL/L
CHOLEST SERPL-MCNC: 188 MG/DL
CO2 SERPL-SCNC: 25 MMOL/L
COLOR: YELLOW
CREAT SERPL-MCNC: 0.68 MG/DL
EGFR: 97 ML/MIN/1.73M2
ESTIMATED AVERAGE GLUCOSE: 105 MG/DL
GLUCOSE QUALITATIVE U: NEGATIVE MG/DL
GLUCOSE SERPL-MCNC: 69 MG/DL
HBA1C MFR BLD HPLC: 5.3 %
HDLC SERPL-MCNC: 48 MG/DL
KETONES URINE: NEGATIVE MG/DL
LDLC SERPL CALC-MCNC: 102 MG/DL
LEUKOCYTE ESTERASE URINE: NEGATIVE
NITRITE URINE: NEGATIVE
NONHDLC SERPL-MCNC: 140 MG/DL
PH URINE: 6
POTASSIUM SERPL-SCNC: 4.1 MMOL/L
PROT SERPL-MCNC: 7.2 G/DL
PROTEIN URINE: NEGATIVE MG/DL
SODIUM SERPL-SCNC: 142 MMOL/L
SPECIFIC GRAVITY URINE: 1.01
T3 SERPL-MCNC: 167 NG/DL
T3FREE SERPL-MCNC: 4.43 PG/ML
T3RU NFR SERPL: 1.1 TBI
T4 FREE SERPL-MCNC: 0.9 NG/DL
T4 SERPL-MCNC: 5.1 UG/DL
TRIGL SERPL-MCNC: 185 MG/DL
TSH SERPL-ACNC: 2.03 UIU/ML
UROBILINOGEN URINE: 0.2 MG/DL

## 2023-06-25 LAB — T3REVERSE SERPL-MCNC: 10.8 NG/DL

## 2023-06-27 ENCOUNTER — NON-APPOINTMENT (OUTPATIENT)
Age: 63
End: 2023-06-27

## 2023-06-29 ENCOUNTER — TRANSCRIPTION ENCOUNTER (OUTPATIENT)
Age: 63
End: 2023-06-29

## 2023-07-01 ENCOUNTER — NON-APPOINTMENT (OUTPATIENT)
Age: 63
End: 2023-07-01

## 2023-07-02 ENCOUNTER — NON-APPOINTMENT (OUTPATIENT)
Age: 63
End: 2023-07-02

## 2023-07-04 ENCOUNTER — TRANSCRIPTION ENCOUNTER (OUTPATIENT)
Age: 63
End: 2023-07-04

## 2023-07-05 ENCOUNTER — NON-APPOINTMENT (OUTPATIENT)
Age: 63
End: 2023-07-05

## 2023-07-05 ENCOUNTER — TRANSCRIPTION ENCOUNTER (OUTPATIENT)
Age: 63
End: 2023-07-05

## 2023-07-06 ENCOUNTER — TRANSCRIPTION ENCOUNTER (OUTPATIENT)
Age: 63
End: 2023-07-06

## 2023-07-14 ENCOUNTER — TRANSCRIPTION ENCOUNTER (OUTPATIENT)
Age: 63
End: 2023-07-14

## 2023-07-14 RX ORDER — BIMATOPROST 0.3 MG/ML
0.03 SOLUTION/ DROPS OPHTHALMIC
Qty: 3 | Refills: 11 | Status: ACTIVE | COMMUNITY
Start: 2021-05-12 | End: 1900-01-01

## 2023-07-20 ENCOUNTER — NON-APPOINTMENT (OUTPATIENT)
Age: 63
End: 2023-07-20

## 2023-07-20 ENCOUNTER — TRANSCRIPTION ENCOUNTER (OUTPATIENT)
Age: 63
End: 2023-07-20

## 2023-08-15 DIAGNOSIS — M54.50 LOW BACK PAIN, UNSPECIFIED: ICD-10-CM

## 2023-08-16 ENCOUNTER — NON-APPOINTMENT (OUTPATIENT)
Age: 63
End: 2023-08-16

## 2023-08-18 ENCOUNTER — TRANSCRIPTION ENCOUNTER (OUTPATIENT)
Age: 63
End: 2023-08-18

## 2023-09-15 ENCOUNTER — TRANSCRIPTION ENCOUNTER (OUTPATIENT)
Age: 63
End: 2023-09-15

## 2023-09-18 ENCOUNTER — TRANSCRIPTION ENCOUNTER (OUTPATIENT)
Age: 63
End: 2023-09-18

## 2023-09-20 ENCOUNTER — TRANSCRIPTION ENCOUNTER (OUTPATIENT)
Age: 63
End: 2023-09-20

## 2023-09-20 LAB
ALBUMIN SERPL ELPH-MCNC: 4.3 G/DL
ALBUMIN SERPL ELPH-MCNC: 4.5 G/DL
ALP BLD-CCNC: 53 U/L
ALP BLD-CCNC: 54 U/L
ALT SERPL-CCNC: 17 U/L
ALT SERPL-CCNC: 18 U/L
ANION GAP SERPL CALC-SCNC: 11 MMOL/L
ANION GAP SERPL CALC-SCNC: 13 MMOL/L
AST SERPL-CCNC: 18 U/L
AST SERPL-CCNC: 18 U/L
BILIRUB SERPL-MCNC: 0.4 MG/DL
BILIRUB SERPL-MCNC: 0.5 MG/DL
BUN SERPL-MCNC: 11 MG/DL
BUN SERPL-MCNC: 11 MG/DL
CALCIUM SERPL-MCNC: 9.1 MG/DL
CALCIUM SERPL-MCNC: 9.1 MG/DL
CHLORIDE SERPL-SCNC: 103 MMOL/L
CHLORIDE SERPL-SCNC: 104 MMOL/L
CO2 SERPL-SCNC: 22 MMOL/L
CO2 SERPL-SCNC: 23 MMOL/L
CREAT SERPL-MCNC: 0.57 MG/DL
CREAT SERPL-MCNC: 0.61 MG/DL
EGFR: 101 ML/MIN/1.73M2
EGFR: 99 ML/MIN/1.73M2
GLUCOSE SERPL-MCNC: 93 MG/DL
GLUCOSE SERPL-MCNC: 97 MG/DL
HCT VFR BLD CALC: 42.4 %
HGB BLD-MCNC: 13.7 G/DL
MCHC RBC-ENTMCNC: 30.2 PG
MCHC RBC-ENTMCNC: 32.3 GM/DL
MCV RBC AUTO: 93.4 FL
PLATELET # BLD AUTO: 184 K/UL
POTASSIUM SERPL-SCNC: 4.5 MMOL/L
POTASSIUM SERPL-SCNC: 4.5 MMOL/L
PROT SERPL-MCNC: 6.6 G/DL
PROT SERPL-MCNC: 7 G/DL
RBC # BLD: 4.54 M/UL
RBC # FLD: 13.1 %
SODIUM SERPL-SCNC: 137 MMOL/L
SODIUM SERPL-SCNC: 139 MMOL/L
T3FREE SERPL-MCNC: 4.51 PG/ML
T4 FREE SERPL-MCNC: 1.1 NG/DL
TSH SERPL-ACNC: 0.17 UIU/ML
WBC # FLD AUTO: 4.81 K/UL

## 2023-09-21 ENCOUNTER — TRANSCRIPTION ENCOUNTER (OUTPATIENT)
Age: 63
End: 2023-09-21

## 2023-09-25 ENCOUNTER — APPOINTMENT (OUTPATIENT)
Dept: DERMATOLOGY | Facility: CLINIC | Age: 63
End: 2023-09-25
Payer: MEDICARE

## 2023-09-25 DIAGNOSIS — Z85.828 PERSONAL HISTORY OF OTHER MALIGNANT NEOPLASM OF SKIN: ICD-10-CM

## 2023-09-25 DIAGNOSIS — L82.1 OTHER SEBORRHEIC KERATOSIS: ICD-10-CM

## 2023-09-25 DIAGNOSIS — Z79.899 OTHER LONG TERM (CURRENT) DRUG THERAPY: ICD-10-CM

## 2023-09-25 DIAGNOSIS — L72.0 EPIDERMAL CYST: ICD-10-CM

## 2023-09-25 DIAGNOSIS — L66.1 LICHEN PLANOPILARIS: ICD-10-CM

## 2023-09-25 PROCEDURE — 17110 DESTRUCTION B9 LES UP TO 14: CPT

## 2023-09-25 PROCEDURE — 99214 OFFICE O/P EST MOD 30 MIN: CPT | Mod: 25

## 2023-09-25 PROCEDURE — 17000 DESTRUCT PREMALG LESION: CPT | Mod: 59

## 2023-09-25 RX ORDER — FLUOCINONIDE 0.5 MG/ML
0.05 SOLUTION TOPICAL
Qty: 1 | Refills: 11 | Status: ACTIVE | COMMUNITY
Start: 2021-05-12 | End: 1900-01-01

## 2023-09-25 RX ORDER — TRETINOIN 0.25 MG/G
0.03 CREAM TOPICAL
Qty: 1 | Refills: 11 | Status: ACTIVE | COMMUNITY
Start: 2021-11-29 | End: 1900-01-01

## 2023-10-12 ENCOUNTER — NON-APPOINTMENT (OUTPATIENT)
Age: 63
End: 2023-10-12

## 2023-10-25 ENCOUNTER — APPOINTMENT (OUTPATIENT)
Dept: INTERNAL MEDICINE | Facility: CLINIC | Age: 63
End: 2023-10-25
Payer: MEDICARE

## 2023-10-25 VITALS
DIASTOLIC BLOOD PRESSURE: 73 MMHG | BODY MASS INDEX: 25.94 KG/M2 | TEMPERATURE: 95.4 F | OXYGEN SATURATION: 95 % | SYSTOLIC BLOOD PRESSURE: 108 MMHG | HEIGHT: 63 IN | HEART RATE: 78 BPM | WEIGHT: 146.38 LBS

## 2023-10-25 DIAGNOSIS — Z12.39 ENCOUNTER FOR OTHER SCREENING FOR MALIGNANT NEOPLASM OF BREAST: ICD-10-CM

## 2023-10-25 DIAGNOSIS — L65.9 NONSCARRING HAIR LOSS, UNSPECIFIED: ICD-10-CM

## 2023-10-25 PROCEDURE — G0438: CPT

## 2023-10-25 RX ORDER — ACETAMINOPHEN 500 MG/1
500 TABLET ORAL EVERY 8 HOURS
Qty: 60 | Refills: 0 | Status: ACTIVE | COMMUNITY
Start: 2022-06-07 | End: 1900-01-01

## 2023-11-01 ENCOUNTER — TRANSCRIPTION ENCOUNTER (OUTPATIENT)
Age: 63
End: 2023-11-01

## 2023-11-02 ENCOUNTER — TRANSCRIPTION ENCOUNTER (OUTPATIENT)
Age: 63
End: 2023-11-02

## 2023-11-13 DIAGNOSIS — M79.605 PAIN IN LEFT LEG: ICD-10-CM

## 2023-11-17 ENCOUNTER — TRANSCRIPTION ENCOUNTER (OUTPATIENT)
Age: 63
End: 2023-11-17

## 2023-11-20 ENCOUNTER — TRANSCRIPTION ENCOUNTER (OUTPATIENT)
Age: 63
End: 2023-11-20

## 2023-11-21 ENCOUNTER — NON-APPOINTMENT (OUTPATIENT)
Age: 63
End: 2023-11-21

## 2023-11-22 RX ORDER — MYCOPHENOLATE MOFETIL 500 MG/1
500 TABLET ORAL
Qty: 360 | Refills: 0 | Status: ACTIVE | COMMUNITY
Start: 2023-11-22 | End: 1900-01-01

## 2023-12-06 ENCOUNTER — TRANSCRIPTION ENCOUNTER (OUTPATIENT)
Age: 63
End: 2023-12-06

## 2023-12-12 ENCOUNTER — TRANSCRIPTION ENCOUNTER (OUTPATIENT)
Age: 63
End: 2023-12-12

## 2023-12-14 ENCOUNTER — TRANSCRIPTION ENCOUNTER (OUTPATIENT)
Age: 63
End: 2023-12-14

## 2023-12-28 ENCOUNTER — APPOINTMENT (OUTPATIENT)
Dept: ORTHOPEDIC SURGERY | Facility: CLINIC | Age: 63
End: 2023-12-28
Payer: MEDICARE

## 2023-12-28 DIAGNOSIS — M79.644 PAIN IN RIGHT FINGER(S): ICD-10-CM

## 2023-12-28 PROCEDURE — 99213 OFFICE O/P EST LOW 20 MIN: CPT

## 2023-12-28 PROCEDURE — 73130 X-RAY EXAM OF HAND: CPT | Mod: 50

## 2023-12-28 NOTE — PHYSICAL EXAM
[de-identified] : Physical exam demonstrates the patient to be alert and oriented x 3 and capable of ambulation. The patient is well-developed and well-nourished in no apparent respiratory distress. The majority of the skin is intact bilaterally in the upper extremities without any bilateral elbow lymphadenopathy.  There is no tenderness over the right long finger MCP, however, there is some tenderness over the MCP RCL origin.  No extensor tendon subluxation or tenderness over the extensor tendon vera.  There is discomfort with 30 degree flexion of the right long finger and ulnar deviation. There is no instability with radial and ulnar deviation in extension. Sensation intact to light touch.  [de-identified] : PA, lateral and oblique X-Rays of the right long finger were obtained today to assess for bony injury.  No appreciation of acute fracture or dislocation.  No significant degenerative changes at the MCP joints.

## 2023-12-28 NOTE — HISTORY OF PRESENT ILLNESS
[FreeTextEntry1] : 63 year old female is presenting for follow up in regard to her atraumatic right ring finger pain that started a few weeks ago. The patient notes that she has been lifting a lot over the past month because she is moving to [place]. The patient has a history of Hashimoto's disease.

## 2023-12-28 NOTE — ASSESSMENT
[FreeTextEntry1] : My impression is the patient has a stable right long finger MCP RCL sprain.  Initial treatment options were discussed and I recommended an initial conservative approach with adali splinting the long and index fingers together to help support and offload the RCL.  I cautioned against any activities that pose risk for finger injury.  I explained that the treatment of this takes time, sometimes weeks to months, to notice significant improvement.  She will plan to follow-up with me if her symptoms do not improve over the next 2 months.  She is planning to move to [place] and will keep me updated on things.

## 2024-01-08 ENCOUNTER — APPOINTMENT (OUTPATIENT)
Dept: INTERNAL MEDICINE | Facility: CLINIC | Age: 64
End: 2024-01-08
Payer: MEDICARE

## 2024-01-08 VITALS
SYSTOLIC BLOOD PRESSURE: 101 MMHG | TEMPERATURE: 96.2 F | DIASTOLIC BLOOD PRESSURE: 67 MMHG | HEIGHT: 63 IN | OXYGEN SATURATION: 99 % | WEIGHT: 149.31 LBS | BODY MASS INDEX: 26.46 KG/M2 | HEART RATE: 68 BPM

## 2024-01-08 DIAGNOSIS — E03.9 HYPOTHYROIDISM, UNSPECIFIED: ICD-10-CM

## 2024-01-08 DIAGNOSIS — E06.3 AUTOIMMUNE THYROIDITIS: ICD-10-CM

## 2024-01-08 DIAGNOSIS — Z00.00 ENCOUNTER FOR GENERAL ADULT MEDICAL EXAMINATION W/OUT ABNORMAL FINDINGS: ICD-10-CM

## 2024-01-08 PROCEDURE — 36415 COLL VENOUS BLD VENIPUNCTURE: CPT

## 2024-01-08 PROCEDURE — 99213 OFFICE O/P EST LOW 20 MIN: CPT | Mod: 25

## 2024-01-08 RX ORDER — METFORMIN HYDROCHLORIDE 500 MG/1
500 TABLET, COATED ORAL
Qty: 90 | Refills: 3 | Status: ACTIVE | COMMUNITY
Start: 1900-01-01 | End: 1900-01-01

## 2024-01-08 RX ORDER — THYROID, PORCINE 90 MG/1
90 TABLET ORAL
Qty: 90 | Refills: 1 | Status: ACTIVE | COMMUNITY
Start: 2017-08-25 | End: 1900-01-01

## 2024-01-08 RX ORDER — THYROID, PORCINE 15 MG/1
15 TABLET ORAL DAILY
Qty: 30 | Refills: 5 | Status: ACTIVE | COMMUNITY
Start: 2023-05-15 | End: 1900-01-01

## 2024-01-08 RX ORDER — THYROID, PORCINE 60 MG/1
60 TABLET ORAL DAILY
Qty: 90 | Refills: 1 | Status: ACTIVE | COMMUNITY
Start: 2024-01-08 | End: 1900-01-01

## 2024-01-08 NOTE — PHYSICAL EXAM
[No Acute Distress] : no acute distress [Well Nourished] : well nourished [Well Developed] : well developed [Well-Appearing] : well-appearing [Normal Sclera/Conjunctiva] : normal sclera/conjunctiva [No JVD] : no jugular venous distention [No Lymphadenopathy] : no lymphadenopathy [Supple] : supple [Thyroid Normal, No Nodules] : the thyroid was normal and there were no nodules present [No Respiratory Distress] : no respiratory distress  [No Accessory Muscle Use] : no accessory muscle use [Clear to Auscultation] : lungs were clear to auscultation bilaterally [Normal Rate] : normal rate  [Regular Rhythm] : with a regular rhythm [Normal S1, S2] : normal S1 and S2 [No Murmur] : no murmur heard

## 2024-01-08 NOTE — ASSESSMENT
[FreeTextEntry1] : Cy/EGD done ~ 2 weeks ago as per pt results were normal Vaccines UTD Had Mammo in Jan, as per pt, no abnormalities.

## 2024-01-08 NOTE — REVIEW OF SYSTEMS
[Fever] : no fever [Chills] : no chills [Fatigue] : no fatigue [Night Sweats] : no night sweats [Chest Pain] : no chest pain [Palpitations] : no palpitations [Lower Ext Edema] : no lower extremity edema [Shortness Of Breath] : no shortness of breath [Wheezing] : no wheezing [Cough] : no cough [Dyspnea on Exertion] : no dyspnea on exertion [Abdominal Pain] : no abdominal pain [Nausea] : no nausea [Constipation] : no constipation [Diarrhea] : diarrhea [Vomiting] : no vomiting [Heartburn] : no heartburn [Dysuria] : no dysuria [Incontinence] : no incontinence [Frequency] : no frequency

## 2024-01-08 NOTE — HISTORY OF PRESENT ILLNESS
[de-identified] : Ms. RODRÍGUEZ is a63 year F with PMH of hypothyroidism, osteopenia, alopecia who comes for follow up. Refer feeling fine, no new complaints. She is moving to [place] on February.

## 2024-01-09 ENCOUNTER — TRANSCRIPTION ENCOUNTER (OUTPATIENT)
Age: 64
End: 2024-01-09

## 2024-01-09 LAB
ALBUMIN SERPL ELPH-MCNC: 4.5 G/DL
ALP BLD-CCNC: 54 U/L
ALT SERPL-CCNC: 29 U/L
ANION GAP SERPL CALC-SCNC: 10 MMOL/L
AST SERPL-CCNC: 26 U/L
BASOPHILS # BLD AUTO: 0.06 K/UL
BASOPHILS NFR BLD AUTO: 0.9 %
BILIRUB SERPL-MCNC: 0.5 MG/DL
BUN SERPL-MCNC: 17 MG/DL
CALCIUM SERPL-MCNC: 9.1 MG/DL
CHLORIDE SERPL-SCNC: 104 MMOL/L
CHOLEST SERPL-MCNC: 203 MG/DL
CO2 SERPL-SCNC: 25 MMOL/L
CREAT SERPL-MCNC: 0.6 MG/DL
EGFR: 100 ML/MIN/1.73M2
EOSINOPHIL # BLD AUTO: 0.2 K/UL
EOSINOPHIL NFR BLD AUTO: 3.1 %
ESTIMATED AVERAGE GLUCOSE: 103 MG/DL
GLUCOSE SERPL-MCNC: 93 MG/DL
HBA1C MFR BLD HPLC: 5.2 %
HCT VFR BLD CALC: 40.7 %
HDLC SERPL-MCNC: 55 MG/DL
HGB BLD-MCNC: 14.5 G/DL
IMM GRANULOCYTES NFR BLD AUTO: 0.3 %
LDLC SERPL CALC-MCNC: 131 MG/DL
LYMPHOCYTES # BLD AUTO: 1.98 K/UL
LYMPHOCYTES NFR BLD AUTO: 30.7 %
MAN DIFF?: NORMAL
MCHC RBC-ENTMCNC: 31.6 PG
MCHC RBC-ENTMCNC: 35.6 GM/DL
MCV RBC AUTO: 88.7 FL
MONOCYTES # BLD AUTO: 0.67 K/UL
MONOCYTES NFR BLD AUTO: 10.4 %
NEUTROPHILS # BLD AUTO: 3.53 K/UL
NEUTROPHILS NFR BLD AUTO: 54.6 %
NONHDLC SERPL-MCNC: 148 MG/DL
PLATELET # BLD AUTO: 188 K/UL
POTASSIUM SERPL-SCNC: 4.4 MMOL/L
PROT SERPL-MCNC: 7.1 G/DL
RBC # BLD: 4.59 M/UL
RBC # FLD: 12.7 %
SODIUM SERPL-SCNC: 139 MMOL/L
T3FREE SERPL-MCNC: 4.07 PG/ML
TRIGL SERPL-MCNC: 92 MG/DL
TSH SERPL-ACNC: 1.29 UIU/ML
WBC # FLD AUTO: 6.46 K/UL

## 2024-01-10 ENCOUNTER — APPOINTMENT (OUTPATIENT)
Dept: DERMATOLOGY | Facility: CLINIC | Age: 64
End: 2024-01-10
Payer: MEDICARE

## 2024-01-10 DIAGNOSIS — L82.0 INFLAMED SEBORRHEIC KERATOSIS: ICD-10-CM

## 2024-01-10 DIAGNOSIS — L57.0 ACTINIC KERATOSIS: ICD-10-CM

## 2024-01-10 DIAGNOSIS — L81.4 OTHER MELANIN HYPERPIGMENTATION: ICD-10-CM

## 2024-01-10 DIAGNOSIS — Z12.83 ENCOUNTER FOR SCREENING FOR MALIGNANT NEOPLASM OF SKIN: ICD-10-CM

## 2024-01-10 DIAGNOSIS — L82.1 OTHER SEBORRHEIC KERATOSIS: ICD-10-CM

## 2024-01-10 PROCEDURE — 17110 DESTRUCTION B9 LES UP TO 14: CPT

## 2024-01-10 PROCEDURE — 17000 DESTRUCT PREMALG LESION: CPT | Mod: 59

## 2024-01-10 PROCEDURE — 99213 OFFICE O/P EST LOW 20 MIN: CPT | Mod: 25

## 2024-01-10 NOTE — ASSESSMENT
[FreeTextEntry1] : # Skin cancer screening   Sun protection reviewed. The patient was educated regarding appropriate sun protection measures, including wearing sunscreen with SPF 30 or higher when outdoors, sun protective clothing, and sun avoidance.  #Lentigines #Seborrheic keratosis  Patient was reassured of the benign nature of these findings. No further treatment needed at this time. If any lesion changes or becomes symptomatic I recommend follow-up  #  Actinic keratosis  Provided anticipatory guidance and education regarding these lesions.  Given there is a risk of malignancy without treatment, I would recommend treatment with cryotherapy.   Cryotherapy Procedure Note.  Lesion(s) treated: 1 , Location: upper back  After obtaining verbal consent, including counseling on risks of dyspigmentation and scarring, liquid nitrogen was applied to the above lesions. The patient tolerated the procedure well.  Wound care was reviewed.  # Seborrheic Keratoses, clinically inflamed Natural history and treatment options discussed. Recommend treatment with cryotherapy to which the patient is agreeable.  Cryotherapy Procedure Note. Lesion(s) treated: 3 , Location: lower back  After obtaining verbal consent, including counseling on risks of dyspigmentation and scarring, liquid nitrogen was applied to the above lesions. The patient tolerated the procedure well. Wound care was reviewed.  F/U PRN since she will be moving to [place]. Annual skin checks with a dermatologist recommended

## 2024-01-10 NOTE — HISTORY OF PRESENT ILLNESS
[FreeTextEntry1] : RPV- skin check  [de-identified] : Lashaun Ardon is a 63 years old F presenting for skin check.  -She hit her shins on shopping cart 2 weeks ago, has purple/dark stain on left shin she is worried about.  -Itchy inflamed lesions on lower back -She is moving to [place] with her father on Sunday.  -Oscaria doing well on tretinoin. She has plenty of medication, does not need refills.  -[place]. She reports being diligent with sunscreen now.   Hx:  SCC on back, removed in early 2000s

## 2024-01-10 NOTE — HISTORY OF PRESENT ILLNESS
[FreeTextEntry1] : RPV- skin check  [de-identified] : Lashaun Ardon is a 63 years old F presenting for skin check.  -She hit her shins on shopping cart 2 weeks ago, has purple/dark stain on left shin she is worried about.  -Itchy inflamed lesions on lower back -She is moving to [place] with her father on Sunday.  -Oscaria doing well on tretinoin. She has plenty of medication, does not need refills.  -[place]. She reports being diligent with sunscreen now.   Hx:  SCC on back, removed in early 2000s

## 2024-01-10 NOTE — PHYSICAL EXAM
[Alert] : alert [Oriented x 3] : ~L oriented x 3 [Well Nourished] : well nourished [Conjunctiva Non-injected] : conjunctiva non-injected [No Visual Lymphadenopathy] : no visual  lymphadenopathy [No Clubbing] : no clubbing [No Edema] : no edema [No Bromhidrosis] : no bromhidrosis [No Chromhidrosis] : no chromhidrosis [Full Body Skin Exam Performed] : performed [FreeTextEntry3] : Genital exam not performed

## 2024-01-10 NOTE — ASSESSMENT
[FreeTextEntry1] : # Skin cancer screening   Sun protection reviewed. The patient was educated regarding appropriate sun protection measures, including wearing sunscreen with SPF 30 or higher when outdoors, sun protective clothing, and sun avoidance.  #Lentigines #Seborrheic keratosis  Patient was reassured of the benign nature of these findings. No further treatment needed at this time. If any lesion changes or becomes symptomatic I recommend follow-up  #  Actinic keratosis  Provided anticipatory guidance and education regarding these lesions.  Given there is a risk of malignancy without treatment, I would recommend treatment with cryotherapy.   Cryotherapy Procedure Note.  Lesion(s) treated: 1 , Location: upper back  After obtaining verbal consent, including counseling on risks of dyspigmentation and scarring, liquid nitrogen was applied to the above lesions. The patient tolerated the procedure well.  Wound care was reviewed.  # Seborrheic Keratoses, clinically inflamed Natural history and treatment options discussed. Recommend treatment with cryotherapy to which the patient is agreeable.  Cryotherapy Procedure Note. Lesion(s) treated: 3 , Location: lower back  After obtaining verbal consent, including counseling on risks of dyspigmentation and scarring, liquid nitrogen was applied to the above lesions. The patient tolerated the procedure well. Wound care was reviewed.  F/U PRN since she will be moving to [place]. Annual skin checks with a dermatologist recommended

## 2024-01-11 ENCOUNTER — TRANSCRIPTION ENCOUNTER (OUTPATIENT)
Age: 64
End: 2024-01-11

## 2024-02-12 ENCOUNTER — APPOINTMENT (OUTPATIENT)
Dept: DERMATOLOGY | Facility: CLINIC | Age: 64
End: 2024-02-12

## 2024-09-23 ENCOUNTER — TRANSCRIPTION ENCOUNTER (OUTPATIENT)
Age: 64
End: 2024-09-23